# Patient Record
Sex: FEMALE | Race: WHITE | NOT HISPANIC OR LATINO
[De-identification: names, ages, dates, MRNs, and addresses within clinical notes are randomized per-mention and may not be internally consistent; named-entity substitution may affect disease eponyms.]

---

## 2017-01-10 ENCOUNTER — APPOINTMENT (OUTPATIENT)
Dept: SURGERY | Facility: CLINIC | Age: 62
End: 2017-01-10

## 2017-01-12 LAB
24R-OH-CALCIDIOL SERPL-MCNC: 69.3 PG/ML
CALCIUM SERPL-MCNC: 9.7 MG/DL
CALCIUM SERPL-MCNC: 9.7 MG/DL
PARATHYROID HORMONE INTACT: 48 PG/ML

## 2017-01-17 ENCOUNTER — OTHER (OUTPATIENT)
Age: 62
End: 2017-01-17

## 2017-07-13 ENCOUNTER — APPOINTMENT (OUTPATIENT)
Dept: SURGERY | Facility: CLINIC | Age: 62
End: 2017-07-13

## 2017-07-14 LAB
24R-OH-CALCIDIOL SERPL-MCNC: 61.8 PG/ML
CALCIUM SERPL-MCNC: 9.6 MG/DL
CALCIUM SERPL-MCNC: 9.6 MG/DL
PARATHYROID HORMONE INTACT: 32 PG/ML

## 2017-07-18 ENCOUNTER — MEDICATION RENEWAL (OUTPATIENT)
Age: 62
End: 2017-07-18

## 2017-07-20 ENCOUNTER — OTHER (OUTPATIENT)
Age: 62
End: 2017-07-20

## 2017-10-12 ENCOUNTER — NON-APPOINTMENT (OUTPATIENT)
Age: 62
End: 2017-10-12

## 2017-10-12 ENCOUNTER — APPOINTMENT (OUTPATIENT)
Dept: FAMILY MEDICINE | Facility: CLINIC | Age: 62
End: 2017-10-12
Payer: COMMERCIAL

## 2017-10-12 VITALS
HEIGHT: 64 IN | SYSTOLIC BLOOD PRESSURE: 128 MMHG | BODY MASS INDEX: 28.17 KG/M2 | HEART RATE: 72 BPM | TEMPERATURE: 98.3 F | DIASTOLIC BLOOD PRESSURE: 86 MMHG | OXYGEN SATURATION: 99 % | WEIGHT: 165 LBS

## 2017-10-12 PROCEDURE — 90472 IMMUNIZATION ADMIN EACH ADD: CPT

## 2017-10-12 PROCEDURE — 90715 TDAP VACCINE 7 YRS/> IM: CPT

## 2017-10-12 PROCEDURE — 93000 ELECTROCARDIOGRAM COMPLETE: CPT

## 2017-10-12 PROCEDURE — 99396 PREV VISIT EST AGE 40-64: CPT | Mod: 25

## 2017-10-12 PROCEDURE — G0008: CPT

## 2017-10-12 PROCEDURE — 36415 COLL VENOUS BLD VENIPUNCTURE: CPT

## 2017-10-12 PROCEDURE — 90688 IIV4 VACCINE SPLT 0.5 ML IM: CPT

## 2017-10-13 ENCOUNTER — RX RENEWAL (OUTPATIENT)
Age: 62
End: 2017-10-13

## 2017-12-01 ENCOUNTER — NON-APPOINTMENT (OUTPATIENT)
Age: 62
End: 2017-12-01

## 2017-12-01 ENCOUNTER — APPOINTMENT (OUTPATIENT)
Dept: CARDIOLOGY | Facility: CLINIC | Age: 62
End: 2017-12-01
Payer: COMMERCIAL

## 2017-12-01 VITALS
WEIGHT: 162 LBS | HEART RATE: 81 BPM | SYSTOLIC BLOOD PRESSURE: 127 MMHG | HEIGHT: 64 IN | DIASTOLIC BLOOD PRESSURE: 87 MMHG | BODY MASS INDEX: 27.66 KG/M2 | OXYGEN SATURATION: 97 %

## 2017-12-01 PROCEDURE — 93000 ELECTROCARDIOGRAM COMPLETE: CPT

## 2017-12-01 PROCEDURE — 99243 OFF/OP CNSLTJ NEW/EST LOW 30: CPT | Mod: 25

## 2017-12-01 RX ORDER — SCOPOLAMINE 1.5 MG/1
1 PATCH, EXTENDED RELEASE TRANSDERMAL
Qty: 4 | Refills: 1 | Status: DISCONTINUED | COMMUNITY
Start: 2017-10-16 | End: 2017-12-01

## 2017-12-07 ENCOUNTER — APPOINTMENT (OUTPATIENT)
Dept: CARDIOLOGY | Facility: CLINIC | Age: 62
End: 2017-12-07
Payer: COMMERCIAL

## 2017-12-07 PROCEDURE — 93306 TTE W/DOPPLER COMPLETE: CPT

## 2017-12-28 ENCOUNTER — APPOINTMENT (OUTPATIENT)
Dept: CARDIOLOGY | Facility: CLINIC | Age: 62
End: 2017-12-28
Payer: COMMERCIAL

## 2017-12-28 ENCOUNTER — NON-APPOINTMENT (OUTPATIENT)
Age: 62
End: 2017-12-28

## 2017-12-28 VITALS
DIASTOLIC BLOOD PRESSURE: 82 MMHG | BODY MASS INDEX: 27.66 KG/M2 | HEIGHT: 64 IN | HEART RATE: 83 BPM | OXYGEN SATURATION: 98 % | SYSTOLIC BLOOD PRESSURE: 124 MMHG | WEIGHT: 162 LBS

## 2017-12-28 DIAGNOSIS — Z82.49 FAMILY HISTORY OF ISCHEMIC HEART DISEASE AND OTHER DISEASES OF THE CIRCULATORY SYSTEM: ICD-10-CM

## 2017-12-28 PROCEDURE — 99214 OFFICE O/P EST MOD 30 MIN: CPT | Mod: 25

## 2017-12-28 PROCEDURE — 93000 ELECTROCARDIOGRAM COMPLETE: CPT

## 2018-01-10 ENCOUNTER — APPOINTMENT (OUTPATIENT)
Dept: CARDIOLOGY | Facility: CLINIC | Age: 63
End: 2018-01-10
Payer: COMMERCIAL

## 2018-01-10 PROCEDURE — 78452 HT MUSCLE IMAGE SPECT MULT: CPT

## 2018-01-10 PROCEDURE — 93017 CV STRESS TEST TRACING ONLY: CPT

## 2018-01-10 PROCEDURE — A9500: CPT

## 2018-01-10 PROCEDURE — 93018 CV STRESS TEST I&R ONLY: CPT

## 2018-01-11 ENCOUNTER — RX RENEWAL (OUTPATIENT)
Age: 63
End: 2018-01-11

## 2018-02-05 ENCOUNTER — TRANSCRIPTION ENCOUNTER (OUTPATIENT)
Age: 63
End: 2018-02-05

## 2018-02-06 ENCOUNTER — NON-APPOINTMENT (OUTPATIENT)
Age: 63
End: 2018-02-06

## 2018-02-06 ENCOUNTER — APPOINTMENT (OUTPATIENT)
Dept: CARDIOLOGY | Facility: CLINIC | Age: 63
End: 2018-02-06
Payer: COMMERCIAL

## 2018-02-06 VITALS
SYSTOLIC BLOOD PRESSURE: 141 MMHG | WEIGHT: 162 LBS | OXYGEN SATURATION: 100 % | BODY MASS INDEX: 27.66 KG/M2 | DIASTOLIC BLOOD PRESSURE: 86 MMHG | HEIGHT: 64 IN | HEART RATE: 63 BPM

## 2018-02-06 PROCEDURE — 93000 ELECTROCARDIOGRAM COMPLETE: CPT

## 2018-02-06 PROCEDURE — 99213 OFFICE O/P EST LOW 20 MIN: CPT | Mod: 25

## 2018-04-12 ENCOUNTER — TRANSCRIPTION ENCOUNTER (OUTPATIENT)
Age: 63
End: 2018-04-12

## 2018-04-15 ENCOUNTER — RX RENEWAL (OUTPATIENT)
Age: 63
End: 2018-04-15

## 2018-04-16 ENCOUNTER — TRANSCRIPTION ENCOUNTER (OUTPATIENT)
Age: 63
End: 2018-04-16

## 2018-06-12 ENCOUNTER — TRANSCRIPTION ENCOUNTER (OUTPATIENT)
Age: 63
End: 2018-06-12

## 2018-06-12 ENCOUNTER — APPOINTMENT (OUTPATIENT)
Dept: CARDIOLOGY | Facility: CLINIC | Age: 63
End: 2018-06-12
Payer: COMMERCIAL

## 2018-06-12 VITALS
BODY MASS INDEX: 29.37 KG/M2 | SYSTOLIC BLOOD PRESSURE: 129 MMHG | HEIGHT: 64 IN | OXYGEN SATURATION: 97 % | HEART RATE: 73 BPM | DIASTOLIC BLOOD PRESSURE: 82 MMHG | WEIGHT: 172 LBS

## 2018-06-12 PROCEDURE — 93000 ELECTROCARDIOGRAM COMPLETE: CPT

## 2018-06-12 PROCEDURE — 99213 OFFICE O/P EST LOW 20 MIN: CPT | Mod: 25

## 2018-06-12 RX ORDER — METOPROLOL SUCCINATE 25 MG/1
25 TABLET, EXTENDED RELEASE ORAL DAILY
Qty: 30 | Refills: 3 | Status: DISCONTINUED | COMMUNITY
Start: 2018-02-06 | End: 2018-06-12

## 2018-07-16 ENCOUNTER — APPOINTMENT (OUTPATIENT)
Dept: CARDIOLOGY | Facility: CLINIC | Age: 63
End: 2018-07-16
Payer: COMMERCIAL

## 2018-07-16 ENCOUNTER — NON-APPOINTMENT (OUTPATIENT)
Age: 63
End: 2018-07-16

## 2018-07-16 VITALS
WEIGHT: 170 LBS | HEIGHT: 64 IN | HEART RATE: 80 BPM | OXYGEN SATURATION: 97 % | BODY MASS INDEX: 29.02 KG/M2 | DIASTOLIC BLOOD PRESSURE: 84 MMHG | SYSTOLIC BLOOD PRESSURE: 138 MMHG

## 2018-07-16 DIAGNOSIS — R07.89 OTHER CHEST PAIN: ICD-10-CM

## 2018-07-16 PROCEDURE — 99214 OFFICE O/P EST MOD 30 MIN: CPT | Mod: 25

## 2018-07-16 PROCEDURE — 93000 ELECTROCARDIOGRAM COMPLETE: CPT

## 2018-10-07 ENCOUNTER — TRANSCRIPTION ENCOUNTER (OUTPATIENT)
Age: 63
End: 2018-10-07

## 2018-10-11 ENCOUNTER — TRANSCRIPTION ENCOUNTER (OUTPATIENT)
Age: 63
End: 2018-10-11

## 2018-11-08 ENCOUNTER — RECORD ABSTRACTING (OUTPATIENT)
Age: 63
End: 2018-11-08

## 2018-11-09 ENCOUNTER — APPOINTMENT (OUTPATIENT)
Dept: ENDOCRINOLOGY | Facility: CLINIC | Age: 63
End: 2018-11-09
Payer: COMMERCIAL

## 2018-11-09 VITALS
SYSTOLIC BLOOD PRESSURE: 130 MMHG | BODY MASS INDEX: 30.22 KG/M2 | WEIGHT: 177 LBS | DIASTOLIC BLOOD PRESSURE: 80 MMHG | HEART RATE: 87 BPM | HEIGHT: 64 IN

## 2018-11-09 DIAGNOSIS — Z86.39 PERSONAL HISTORY OF OTHER ENDOCRINE, NUTRITIONAL AND METABOLIC DISEASE: ICD-10-CM

## 2018-11-09 PROCEDURE — 99214 OFFICE O/P EST MOD 30 MIN: CPT

## 2018-11-20 ENCOUNTER — TRANSCRIPTION ENCOUNTER (OUTPATIENT)
Age: 63
End: 2018-11-20

## 2018-12-13 ENCOUNTER — FORM ENCOUNTER (OUTPATIENT)
Age: 63
End: 2018-12-13

## 2018-12-14 ENCOUNTER — APPOINTMENT (OUTPATIENT)
Dept: ULTRASOUND IMAGING | Facility: CLINIC | Age: 63
End: 2018-12-14
Payer: COMMERCIAL

## 2018-12-14 ENCOUNTER — OUTPATIENT (OUTPATIENT)
Dept: OUTPATIENT SERVICES | Facility: HOSPITAL | Age: 63
LOS: 1 days | End: 2018-12-14
Payer: COMMERCIAL

## 2018-12-14 DIAGNOSIS — Z00.8 ENCOUNTER FOR OTHER GENERAL EXAMINATION: ICD-10-CM

## 2018-12-14 DIAGNOSIS — Z90.49 ACQUIRED ABSENCE OF OTHER SPECIFIED PARTS OF DIGESTIVE TRACT: Chronic | ICD-10-CM

## 2018-12-14 DIAGNOSIS — E04.1 NONTOXIC SINGLE THYROID NODULE: ICD-10-CM

## 2018-12-14 PROCEDURE — 76536 US EXAM OF HEAD AND NECK: CPT

## 2018-12-14 PROCEDURE — 76536 US EXAM OF HEAD AND NECK: CPT | Mod: 26

## 2019-01-18 ENCOUNTER — APPOINTMENT (OUTPATIENT)
Dept: CARDIOLOGY | Facility: CLINIC | Age: 64
End: 2019-01-18
Payer: COMMERCIAL

## 2019-01-18 ENCOUNTER — NON-APPOINTMENT (OUTPATIENT)
Age: 64
End: 2019-01-18

## 2019-01-18 VITALS
OXYGEN SATURATION: 100 % | DIASTOLIC BLOOD PRESSURE: 75 MMHG | HEART RATE: 77 BPM | WEIGHT: 175 LBS | SYSTOLIC BLOOD PRESSURE: 114 MMHG | HEIGHT: 64 IN | BODY MASS INDEX: 29.88 KG/M2

## 2019-01-18 PROCEDURE — 93000 ELECTROCARDIOGRAM COMPLETE: CPT

## 2019-01-18 PROCEDURE — 99213 OFFICE O/P EST LOW 20 MIN: CPT | Mod: 25

## 2019-01-28 ENCOUNTER — RX RENEWAL (OUTPATIENT)
Age: 64
End: 2019-01-28

## 2019-02-12 ENCOUNTER — NON-APPOINTMENT (OUTPATIENT)
Age: 64
End: 2019-02-12

## 2019-02-12 NOTE — HISTORY OF PRESENT ILLNESS
[FreeTextEntry1] : Pt presents today for f/u.  I have previously seen her for atypical CP.  She states that she has been feeling well and has no cardiac complaints.  She denies CP, SOB, diaphoresis, palpitations, dizziness, syncope, LE edema, PND. \par She has PMH hypothyroidism\par \par \par

## 2019-02-12 NOTE — PHYSICAL EXAM
[General Appearance - Well Developed] : well developed [Normal Appearance] : normal appearance [Well Groomed] : well groomed [General Appearance - Well Nourished] : well nourished [No Deformities] : no deformities [General Appearance - In No Acute Distress] : no acute distress [Normal Conjunctiva] : the conjunctiva exhibited no abnormalities [Eyelids - No Xanthelasma] : the eyelids demonstrated no xanthelasmas [Normal Oral Mucosa] : normal oral mucosa [No Oral Pallor] : no oral pallor [No Oral Cyanosis] : no oral cyanosis [Respiration, Rhythm And Depth] : normal respiratory rhythm and effort [Exaggerated Use Of Accessory Muscles For Inspiration] : no accessory muscle use [Auscultation Breath Sounds / Voice Sounds] : lungs were clear to auscultation bilaterally [Heart Rate And Rhythm] : heart rate and rhythm were normal [Heart Sounds] : normal S1 and S2 [Murmurs] : no murmurs present [Arterial Pulses Normal] : the arterial pulses were normal [Edema] : no peripheral edema present [Abnormal Walk] : normal gait [Gait - Sufficient For Exercise Testing] : the gait was sufficient for exercise testing [Nail Clubbing] : no clubbing of the fingernails [Cyanosis, Localized] : no localized cyanosis [Petechial Hemorrhages (___cm)] : no petechial hemorrhages [] : no ischemic changes [Oriented To Time, Place, And Person] : oriented to person, place, and time [Impaired Insight] : insight and judgment were intact [Affect] : the affect was normal [Mood] : the mood was normal [No Anxiety] : not feeling anxious [FreeTextEntry1] : no JVD or bruits

## 2019-02-12 NOTE — DISCUSSION/SUMMARY
[FreeTextEntry1] : Pt is a 62 y/o F who presents today for f/u\par She has PMH HLD, RBBB\par No ECG changes\par  TTE done 12/2017 which showed normal LV function without significant valvular pathology\par Nuclear stress test 01/2018 was negative for ischemia\par HLD: she is due for PE with PCP and she will repeat labs at that time.  Advised lifestyle modifications \par She will f/u in 6-12 mnths or sooner PRN\par The described plan was discussed with the pt.  All questions and concerns were addressed to the best of my knowledge.\par

## 2019-05-10 ENCOUNTER — APPOINTMENT (OUTPATIENT)
Dept: ENDOCRINOLOGY | Facility: CLINIC | Age: 64
End: 2019-05-10
Payer: COMMERCIAL

## 2019-05-10 VITALS
BODY MASS INDEX: 29.37 KG/M2 | HEART RATE: 83 BPM | WEIGHT: 172 LBS | HEIGHT: 64 IN | SYSTOLIC BLOOD PRESSURE: 114 MMHG | DIASTOLIC BLOOD PRESSURE: 66 MMHG

## 2019-05-10 PROCEDURE — 99214 OFFICE O/P EST MOD 30 MIN: CPT

## 2019-06-07 ENCOUNTER — APPOINTMENT (OUTPATIENT)
Dept: FAMILY MEDICINE | Facility: CLINIC | Age: 64
End: 2019-06-07
Payer: COMMERCIAL

## 2019-06-07 VITALS
SYSTOLIC BLOOD PRESSURE: 132 MMHG | DIASTOLIC BLOOD PRESSURE: 80 MMHG | BODY MASS INDEX: 30.22 KG/M2 | WEIGHT: 177 LBS | OXYGEN SATURATION: 98 % | HEIGHT: 64 IN | HEART RATE: 86 BPM

## 2019-06-07 VITALS — SYSTOLIC BLOOD PRESSURE: 130 MMHG | DIASTOLIC BLOOD PRESSURE: 80 MMHG

## 2019-06-07 DIAGNOSIS — R07.89 OTHER CHEST PAIN: ICD-10-CM

## 2019-06-07 DIAGNOSIS — Z01.84 ENCOUNTER FOR ANTIBODY RESPONSE EXAMINATION: ICD-10-CM

## 2019-06-07 PROCEDURE — 99396 PREV VISIT EST AGE 40-64: CPT | Mod: 25

## 2019-06-07 PROCEDURE — 36415 COLL VENOUS BLD VENIPUNCTURE: CPT

## 2019-06-07 PROCEDURE — G0444 DEPRESSION SCREEN ANNUAL: CPT

## 2019-06-07 NOTE — END OF VISIT
[FreeTextEntry3] : Medical record entries made by the scribe today today, were at my direction and personally dictated to them by me, Dr. Nilam Huff on Jun 07, 2019. I have reviewed the chart and agree that the record accurately reflects my personal performance of the history, physical exam, assessment, and plan.\par \par

## 2019-06-07 NOTE — HEALTH RISK ASSESSMENT
[0] : 1) Little interest or pleasure doing things: Not at all (0) [Patient reported PAP Smear was normal] : Patient reported PAP Smear was normal [Patient reported mammogram was normal] : Patient reported mammogram was normal [Patient reported bone density results were normal] : Patient reported bone density results were normal [Patient reported colonoscopy was normal] : Patient reported colonoscopy was normal [Good] : ~his/her~  mood as  good [With Family] : lives with family [] :  [# Of Children ___] : has [unfilled] children [HIV test declined] : HIV test declined [] : No [de-identified] : well balanced high calium, dark leafy greens [de-identified] : walking [[id number]] : 0 [de-identified] :  (endo)  (cardio)  (surg) [MammogramDate] : 04/19 [PapSmearDate] : 02/19 [ColonoscopyDate] : 01/16 [PapSmearComments] : Dr. tello [BoneDensityDate] : 06/18 [ColonoscopyComments] : Dr. Messer [HIVDate] : 06/19

## 2019-06-07 NOTE — ADDENDUM
[FreeTextEntry1] : I, Cristina Felix acting as a scribe for Dr. Nilam Huff on Jun 07, 2019  at 2:02 PM\par

## 2019-06-07 NOTE — PLAN
[FreeTextEntry1] : - Blood work today\par - Thyroid sonogram ordered\par - Reviewed risk/benefits/alternatives of the shingrix vaccine. \par - Encourage diet and exercise\par - Follow up with \par - Follow up with Dr. Pepper

## 2019-06-07 NOTE — PHYSICAL EXAM
[Well Nourished] : well nourished [No Acute Distress] : no acute distress [Well Developed] : well developed [Well-Appearing] : well-appearing [Normal Sclera/Conjunctiva] : normal sclera/conjunctiva [PERRL] : pupils equal round and reactive to light [Normal Outer Ear/Nose] : the outer ears and nose were normal in appearance [EOMI] : extraocular movements intact [No JVD] : no jugular venous distention [Normal Oropharynx] : the oropharynx was normal [Supple] : supple [No Lymphadenopathy] : no lymphadenopathy [Thyroid Normal, No Nodules] : the thyroid was normal and there were no nodules present [No Respiratory Distress] : no respiratory distress  [Clear to Auscultation] : lungs were clear to auscultation bilaterally [No Accessory Muscle Use] : no accessory muscle use [Normal Rate] : normal rate  [Regular Rhythm] : with a regular rhythm [Normal S1, S2] : normal S1 and S2 [No Murmur] : no murmur heard [No Carotid Bruits] : no carotid bruits [No Abdominal Bruit] : a ~M bruit was not heard ~T in the abdomen [No Varicosities] : no varicosities [Pedal Pulses Present] : the pedal pulses are present [No Edema] : there was no peripheral edema [No Palpable Aorta] : no palpable aorta [No Extremity Clubbing/Cyanosis] : no extremity clubbing/cyanosis [Non Tender] : non-tender [Soft] : abdomen soft [Non-distended] : non-distended [No Masses] : no abdominal mass palpated [No HSM] : no HSM [Normal Bowel Sounds] : normal bowel sounds [Normal Posterior Cervical Nodes] : no posterior cervical lymphadenopathy [No CVA Tenderness] : no CVA  tenderness [Normal Anterior Cervical Nodes] : no anterior cervical lymphadenopathy [No Spinal Tenderness] : no spinal tenderness [No Joint Swelling] : no joint swelling [Grossly Normal Strength/Tone] : grossly normal strength/tone [Normal Gait] : normal gait [No Rash] : no rash [Coordination Grossly Intact] : coordination grossly intact [No Focal Deficits] : no focal deficits [Deep Tendon Reflexes (DTR)] : deep tendon reflexes were 2+ and symmetric [Normal Affect] : the affect was normal [Normal Insight/Judgement] : insight and judgment were intact

## 2019-06-07 NOTE — HISTORY OF PRESENT ILLNESS
[de-identified] : MARISSA is a 63 year female here for CPE. Patient feels well. Follows up with Dr. Antunez for hypothyroid and thyroid nodule. Currently taking synthroid 75 mcg. Last thyroid sono 12/2018.  Patient up to date with mammogram 4/2019, pap smear 2/2019, bone density 6/18 and colonoscopy 1/16.  Followed up with Evgeny in May. Has appointment in July. Thyroid levels previously elevated, rechecked today by [facility]. Next appointment 11/19. Follows up with [facility].  [FreeTextEntry1] : CPE, pt has no complaints

## 2019-06-13 ENCOUNTER — OTHER (OUTPATIENT)
Age: 64
End: 2019-06-13

## 2019-06-13 DIAGNOSIS — T75.3XXA MOTION SICKNESS, INITIAL ENCOUNTER: ICD-10-CM

## 2019-06-25 ENCOUNTER — MEDICATION RENEWAL (OUTPATIENT)
Age: 64
End: 2019-06-25

## 2019-06-25 LAB
25(OH)D3 SERPL-MCNC: 39.5 NG/ML
ALBUMIN SERPL ELPH-MCNC: 4.5 G/DL
ALP BLD-CCNC: 87 U/L
ALT SERPL-CCNC: 17 U/L
ANION GAP SERPL CALC-SCNC: 16 MMOL/L
APPEARANCE: CLEAR
AST SERPL-CCNC: 20 U/L
BACTERIA: NEGATIVE
BASOPHILS # BLD AUTO: 0.05 K/UL
BASOPHILS NFR BLD AUTO: 0.8 %
BILIRUB SERPL-MCNC: 0.2 MG/DL
BILIRUBIN URINE: NEGATIVE
BLOOD URINE: NEGATIVE
BUN SERPL-MCNC: 32 MG/DL
CALCIUM SERPL-MCNC: 9.7 MG/DL
CHLORIDE SERPL-SCNC: 103 MMOL/L
CHOLEST SERPL-MCNC: 235 MG/DL
CHOLEST/HDLC SERPL: 5 RATIO
CO2 SERPL-SCNC: 25 MMOL/L
COLOR: YELLOW
CREAT SERPL-MCNC: 0.95 MG/DL
EOSINOPHIL # BLD AUTO: 0.05 K/UL
EOSINOPHIL NFR BLD AUTO: 0.8 %
ESTIMATED AVERAGE GLUCOSE: 114 MG/DL
GLUCOSE QUALITATIVE U: NEGATIVE
GLUCOSE SERPL-MCNC: 116 MG/DL
HBA1C MFR BLD HPLC: 5.6 %
HCT VFR BLD CALC: 40.6 %
HCV AB SER QL: NONREACTIVE
HCV S/CO RATIO: 0.26 S/CO
HDLC SERPL-MCNC: 47 MG/DL
HGB BLD-MCNC: 13.1 G/DL
HYALINE CASTS: 0 /LPF
IMM GRANULOCYTES NFR BLD AUTO: 0.2 %
KETONES URINE: NEGATIVE
LDLC SERPL CALC-MCNC: 125 MG/DL
LEUKOCYTE ESTERASE URINE: NEGATIVE
LYMPHOCYTES # BLD AUTO: 1.74 K/UL
LYMPHOCYTES NFR BLD AUTO: 29.1 %
MAN DIFF?: NORMAL
MCHC RBC-ENTMCNC: 30.7 PG
MCHC RBC-ENTMCNC: 32.3 GM/DL
MCV RBC AUTO: 95.1 FL
MEV IGG FLD QL IA: >300 AU/ML
MEV IGG+IGM SER-IMP: POSITIVE
MICROSCOPIC-UA: NORMAL
MONOCYTES # BLD AUTO: 0.41 K/UL
MONOCYTES NFR BLD AUTO: 6.9 %
NEUTROPHILS # BLD AUTO: 3.71 K/UL
NEUTROPHILS NFR BLD AUTO: 62.2 %
NITRITE URINE: NEGATIVE
PH URINE: 7.5
PLATELET # BLD AUTO: 274 K/UL
POTASSIUM SERPL-SCNC: 4.8 MMOL/L
PROT SERPL-MCNC: 7.3 G/DL
PROTEIN URINE: NORMAL
RBC # BLD: 4.27 M/UL
RBC # FLD: 12.4 %
RED BLOOD CELLS URINE: 6 /HPF
SODIUM SERPL-SCNC: 144 MMOL/L
SPECIFIC GRAVITY URINE: 1.03
SQUAMOUS EPITHELIAL CELLS: 0 /HPF
TRIGL SERPL-MCNC: 313 MG/DL
URATE SERPL-MCNC: 5.8 MG/DL
UROBILINOGEN URINE: NORMAL
WBC # FLD AUTO: 5.97 K/UL
WHITE BLOOD CELLS URINE: 0 /HPF

## 2019-08-23 ENCOUNTER — APPOINTMENT (OUTPATIENT)
Dept: ENDOCRINOLOGY | Facility: CLINIC | Age: 64
End: 2019-08-23
Payer: COMMERCIAL

## 2019-08-23 VITALS
HEART RATE: 79 BPM | SYSTOLIC BLOOD PRESSURE: 144 MMHG | HEIGHT: 64 IN | DIASTOLIC BLOOD PRESSURE: 90 MMHG | BODY MASS INDEX: 30.9 KG/M2 | WEIGHT: 181 LBS

## 2019-08-23 PROCEDURE — 99214 OFFICE O/P EST MOD 30 MIN: CPT

## 2019-09-25 ENCOUNTER — APPOINTMENT (OUTPATIENT)
Dept: FAMILY MEDICINE | Facility: CLINIC | Age: 64
End: 2019-09-25
Payer: COMMERCIAL

## 2019-09-25 VITALS
OXYGEN SATURATION: 98 % | DIASTOLIC BLOOD PRESSURE: 90 MMHG | HEIGHT: 64 IN | SYSTOLIC BLOOD PRESSURE: 132 MMHG | TEMPERATURE: 97.7 F | HEART RATE: 81 BPM | BODY MASS INDEX: 30.9 KG/M2 | WEIGHT: 181 LBS

## 2019-09-25 DIAGNOSIS — M54.9 DORSALGIA, UNSPECIFIED: ICD-10-CM

## 2019-09-25 DIAGNOSIS — N28.9 DISORDER OF KIDNEY AND URETER, UNSPECIFIED: ICD-10-CM

## 2019-09-25 PROCEDURE — 99214 OFFICE O/P EST MOD 30 MIN: CPT | Mod: 25

## 2019-09-25 PROCEDURE — 36415 COLL VENOUS BLD VENIPUNCTURE: CPT

## 2019-09-25 RX ORDER — LEVOTHYROXINE SODIUM 75 UG/1
75 TABLET ORAL
Qty: 90 | Refills: 0 | Status: COMPLETED | COMMUNITY
Start: 2019-01-28

## 2019-09-25 NOTE — ASSESSMENT
[FreeTextEntry1] : see lab orders\par \par see radiology orders \par \par Tx pending imaging results

## 2019-09-25 NOTE — PHYSICAL EXAM
[No Acute Distress] : no acute distress [Well Nourished] : well nourished [Well Developed] : well developed [Well-Appearing] : well-appearing [EOMI] : extraocular movements intact [Normal Outer Ear/Nose] : the outer ears and nose were normal in appearance [No Respiratory Distress] : no respiratory distress  [No JVD] : no jugular venous distention [No Accessory Muscle Use] : no accessory muscle use [Clear to Auscultation] : lungs were clear to auscultation bilaterally [Regular Rhythm] : with a regular rhythm [Normal Rate] : normal rate  [Normal S1, S2] : normal S1 and S2 [No Carotid Bruits] : no carotid bruits [No Edema] : there was no peripheral edema [Soft] : abdomen soft [Non Tender] : non-tender [Non-distended] : non-distended [No Masses] : no abdominal mass palpated [No HSM] : no HSM [Normal Bowel Sounds] : normal bowel sounds [No CVA Tenderness] : no CVA  tenderness [Grossly Normal Strength/Tone] : grossly normal strength/tone [Coordination Grossly Intact] : coordination grossly intact [No Rash] : no rash [No Focal Deficits] : no focal deficits [Normal Gait] : normal gait [Normal Affect] : the affect was normal [Normal Mood] : the mood was normal [Normal Insight/Judgement] : insight and judgment were intact

## 2019-09-26 LAB
ALBUMIN SERPL ELPH-MCNC: 4.6 G/DL
ALP BLD-CCNC: 100 U/L
ALT SERPL-CCNC: 18 U/L
ANION GAP SERPL CALC-SCNC: 14 MMOL/L
APPEARANCE: CLEAR
AST SERPL-CCNC: 19 U/L
BACTERIA: NEGATIVE
BASOPHILS # BLD AUTO: 0.03 K/UL
BASOPHILS NFR BLD AUTO: 0.6 %
BILIRUB SERPL-MCNC: 0.4 MG/DL
BILIRUBIN URINE: NEGATIVE
BLOOD URINE: NEGATIVE
BUN SERPL-MCNC: 13 MG/DL
CALCIUM SERPL-MCNC: 9.7 MG/DL
CHLORIDE SERPL-SCNC: 99 MMOL/L
CO2 SERPL-SCNC: 27 MMOL/L
COLOR: YELLOW
CREAT SERPL-MCNC: 0.88 MG/DL
EOSINOPHIL # BLD AUTO: 0.04 K/UL
EOSINOPHIL NFR BLD AUTO: 0.8 %
GLUCOSE QUALITATIVE U: NEGATIVE
GLUCOSE SERPL-MCNC: 94 MG/DL
HCT VFR BLD CALC: 43.3 %
HGB BLD-MCNC: 13.8 G/DL
HYALINE CASTS: 0 /LPF
IMM GRANULOCYTES NFR BLD AUTO: 0 %
KETONES URINE: NEGATIVE
LEUKOCYTE ESTERASE URINE: NEGATIVE
LYMPHOCYTES # BLD AUTO: 1.37 K/UL
LYMPHOCYTES NFR BLD AUTO: 26.8 %
MAN DIFF?: NORMAL
MCHC RBC-ENTMCNC: 30.5 PG
MCHC RBC-ENTMCNC: 31.9 GM/DL
MCV RBC AUTO: 95.6 FL
MICROSCOPIC-UA: NORMAL
MONOCYTES # BLD AUTO: 0.42 K/UL
MONOCYTES NFR BLD AUTO: 8.2 %
NEUTROPHILS # BLD AUTO: 3.26 K/UL
NEUTROPHILS NFR BLD AUTO: 63.6 %
NITRITE URINE: NEGATIVE
PH URINE: 6.5
PLATELET # BLD AUTO: 279 K/UL
POTASSIUM SERPL-SCNC: 4 MMOL/L
PROT SERPL-MCNC: 7.3 G/DL
PROTEIN URINE: NEGATIVE
RBC # BLD: 4.53 M/UL
RBC # FLD: 12.6 %
RED BLOOD CELLS URINE: 1 /HPF
SODIUM SERPL-SCNC: 140 MMOL/L
SPECIFIC GRAVITY URINE: 1
SQUAMOUS EPITHELIAL CELLS: 0 /HPF
UROBILINOGEN URINE: NORMAL
WBC # FLD AUTO: 5.12 K/UL
WHITE BLOOD CELLS URINE: 0 /HPF

## 2019-10-01 LAB — BACTERIA UR CULT: NORMAL

## 2019-10-11 ENCOUNTER — APPOINTMENT (OUTPATIENT)
Dept: UROLOGY | Facility: CLINIC | Age: 64
End: 2019-10-11
Payer: COMMERCIAL

## 2019-10-11 VITALS
DIASTOLIC BLOOD PRESSURE: 85 MMHG | SYSTOLIC BLOOD PRESSURE: 137 MMHG | RESPIRATION RATE: 16 BRPM | WEIGHT: 175 LBS | HEIGHT: 64 IN | OXYGEN SATURATION: 99 % | TEMPERATURE: 98.1 F | BODY MASS INDEX: 29.88 KG/M2 | HEART RATE: 76 BPM

## 2019-10-11 DIAGNOSIS — R31.29 OTHER MICROSCOPIC HEMATURIA: ICD-10-CM

## 2019-10-11 PROCEDURE — 99204 OFFICE O/P NEW MOD 45 MIN: CPT

## 2019-10-11 NOTE — ASSESSMENT
[FreeTextEntry1] : 63 yo F with LEFT hydronephrosis with duplicated system and history of ureteral obstruction. Recommended CTU, sami given AMH from 6/2019. Pt agrees and understands.

## 2019-10-11 NOTE — HISTORY OF PRESENT ILLNESS
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 This began weeks ago, but has since resolved. \par It was moderate in severity. Nothing makes the symptoms better, nothing makes sx worse though patient thinks it may have been associated with a Protein Shake diet she was doing at the time. It is associated with nothing. Recent renal US showed increased LEFT hydronephrosis. UA (6/2019) showed 6 RBCs/hpf. She had a ureteroscopy several years ago for obstruction of her duplicate ureter.  \par No hematuria, no dysuria, no frequency, no urgency, no hesitancy, no straining. No incontinence. \par No fevers, no chills, no nausea, no vomiting. Cr 0.88 (9/2019).\par \par No family history contributory to LEFT hydronephrosis.

## 2019-10-11 NOTE — PHYSICAL EXAM
[General Appearance - Well Developed] : well developed [General Appearance - Well Nourished] : well nourished [Well Groomed] : well groomed [Normal Appearance] : normal appearance [General Appearance - In No Acute Distress] : no acute distress [Edema] : no peripheral edema [Abdomen Soft] : soft [Respiration, Rhythm And Depth] : normal respiratory rhythm and effort [Exaggerated Use Of Accessory Muscles For Inspiration] : no accessory muscle use [Abdomen Tenderness] : non-tender [Costovertebral Angle Tenderness] : no ~M costovertebral angle tenderness [Urinary Bladder Findings] : the bladder was normal on palpation [Normal Station and Gait] : the gait and station were normal for the patient's age [] : no rash [Oriented To Time, Place, And Person] : oriented to person, place, and time [No Focal Deficits] : no focal deficits [Affect] : the affect was normal [Mood] : the mood was normal [Not Anxious] : not anxious [No Palpable Adenopathy] : no palpable adenopathy

## 2019-10-14 LAB
APPEARANCE: CLEAR
BACTERIA UR CULT: NORMAL
BACTERIA: NEGATIVE
BILIRUBIN URINE: NEGATIVE
BLOOD URINE: NEGATIVE
COLOR: YELLOW
GLUCOSE QUALITATIVE U: NEGATIVE
HYALINE CASTS: 0 /LPF
KETONES URINE: NEGATIVE
LEUKOCYTE ESTERASE URINE: NEGATIVE
MICROSCOPIC-UA: NORMAL
NITRITE URINE: NEGATIVE
PH URINE: 7.5
PROTEIN URINE: NEGATIVE
RED BLOOD CELLS URINE: 1 /HPF
SPECIFIC GRAVITY URINE: 1.01
SQUAMOUS EPITHELIAL CELLS: 0 /HPF
UROBILINOGEN URINE: NORMAL
WHITE BLOOD CELLS URINE: 0 /HPF

## 2019-10-31 ENCOUNTER — FORM ENCOUNTER (OUTPATIENT)
Age: 64
End: 2019-10-31

## 2019-11-01 ENCOUNTER — APPOINTMENT (OUTPATIENT)
Dept: CT IMAGING | Facility: CLINIC | Age: 64
End: 2019-11-01
Payer: COMMERCIAL

## 2019-11-01 ENCOUNTER — OUTPATIENT (OUTPATIENT)
Dept: OUTPATIENT SERVICES | Facility: HOSPITAL | Age: 64
LOS: 1 days | End: 2019-11-01
Payer: COMMERCIAL

## 2019-11-01 DIAGNOSIS — Z90.49 ACQUIRED ABSENCE OF OTHER SPECIFIED PARTS OF DIGESTIVE TRACT: Chronic | ICD-10-CM

## 2019-11-01 DIAGNOSIS — N13.30 UNSPECIFIED HYDRONEPHROSIS: ICD-10-CM

## 2019-11-01 PROCEDURE — 74178 CT ABD&PLV WO CNTR FLWD CNTR: CPT

## 2019-11-01 PROCEDURE — 82565 ASSAY OF CREATININE: CPT

## 2019-11-01 PROCEDURE — 74178 CT ABD&PLV WO CNTR FLWD CNTR: CPT | Mod: 26

## 2019-11-08 ENCOUNTER — APPOINTMENT (OUTPATIENT)
Dept: UROLOGY | Facility: CLINIC | Age: 64
End: 2019-11-08
Payer: COMMERCIAL

## 2019-11-08 PROCEDURE — 99213 OFFICE O/P EST LOW 20 MIN: CPT

## 2019-11-14 ENCOUNTER — APPOINTMENT (OUTPATIENT)
Dept: ENDOCRINOLOGY | Facility: CLINIC | Age: 64
End: 2019-11-14

## 2019-11-15 ENCOUNTER — APPOINTMENT (OUTPATIENT)
Dept: ENDOCRINOLOGY | Facility: CLINIC | Age: 64
End: 2019-11-15
Payer: COMMERCIAL

## 2019-11-15 VITALS — WEIGHT: 184 LBS | BODY MASS INDEX: 31.58 KG/M2

## 2019-11-15 VITALS — DIASTOLIC BLOOD PRESSURE: 84 MMHG | HEART RATE: 82 BPM | SYSTOLIC BLOOD PRESSURE: 126 MMHG | HEIGHT: 64 IN

## 2019-11-15 PROCEDURE — 99214 OFFICE O/P EST MOD 30 MIN: CPT

## 2019-11-15 RX ORDER — MULTIVITAMIN
CAPSULE ORAL TWICE DAILY
Refills: 0 | Status: ACTIVE | COMMUNITY

## 2019-11-15 RX ORDER — SCOPOLAMINE 1 MG/3D
1 PATCH, EXTENDED RELEASE TRANSDERMAL
Qty: 1 | Refills: 0 | Status: DISCONTINUED | COMMUNITY
Start: 2019-06-13 | End: 2019-11-15

## 2019-11-15 NOTE — REVIEW OF SYSTEMS
[Recent Weight Gain (___ Lbs)] : recent [unfilled] ~Ulb weight gain [As Noted in HPI] : as noted in HPI [Fatigue] : no fatigue [Blurry Vision] : no blurred vision [Chest Pain] : no chest pain [Palpitations] : no palpitations [Shortness Of Breath] : no shortness of breath [SOB on Exertion] : no shortness of breath during exertion [Constipation] : no constipation [Diarrhea] : no diarrhea [Polyuria] : no polyuria [Nocturia] : no nocturia [Polydipsia] : no polydipsia [Cold Intolerance] : cold tolerant [Heat Intolerance] : heat tolerant

## 2019-11-15 NOTE — PHYSICAL EXAM
[Alert] : alert [No Acute Distress] : no acute distress [Well Nourished] : well nourished [Well Developed] : well developed [EOMI] : extra ocular movement intact [Normal Sclera/Conjunctiva] : normal sclera/conjunctiva [No Neck Mass] : no neck mass was observed [Thyroid Not Enlarged] : the thyroid was not enlarged [Well Healed Scar] : well healed scar [No Thyroid Nodules] : there were no palpable thyroid nodules [Normal Rate and Effort] : normal respiratory rhythm and effort [Clear to Auscultation] : lungs were clear to auscultation bilaterally [Normal S1, S2] : normal S1 and S2 [Normal Rate] : heart rate was normal  [Normal Gait] : normal gait [No Rash] : no rash [Cranial Nerves Intact] : cranial nerves 2-12 were intact [Normal Reflexes] : deep tendon reflexes were 2+ and symmetric [No Tremors] : no tremors [Oriented x3] : oriented to person, place, and time [Normal Insight/Judgement] : insight and judgment were intact [Normal Affect] : the affect was normal [Normal Mood] : the mood was normal

## 2019-11-15 NOTE — HISTORY OF PRESENT ILLNESS
[FreeTextEntry1] : no issues today\par \par Hypothyroid Dx 2010 on routine blood test, on Synthroid (AKBAR) 88 mcg - adherent with dosing and administration, dose increased June 2019\par Left thyroid nodule DX 2016 on sonogram\par denies anterior neck pain, dysphagia or voice changes.\par \par Osteopenia on DXA 2016 and 2018 - h/o primary hyperparathyroidism s/p left inferior parathyroid adenoma removal in 2016 \par on Vit D 1000 units daily. denies back/hip pains. denies falls/fracture\par Dairy intake - no calcium supplements, yogurt daily\par Exercise - walks daily\par

## 2019-11-15 NOTE — REASON FOR VISIT
[Follow-Up: _____] : a [unfilled] follow-up visit [FreeTextEntry1] :  hypothyroid, Left thyroid nodule, PreDM, osteopenia

## 2019-11-15 NOTE — ASSESSMENT
[FreeTextEntry1] : 1. Hypothyroid - clinically and biochemically euthyroid\par - cont Synthroid\par - repeat TFTs 1 week before next visit\par \par 2. small Left thyroid nodule - no seen on sono 12/2018\par -  repeat sono 12/2019\par \par 3. PreDM - worsening A1c\par - cont to montior\par - counseled pt on low carb diet\par - increase CV exercise\par \par 4. Osteopenia - vit D and calcium okay, asymptomatic\par - DXA 6/2010\par - encouraged weight bearing exercises\par - cont Vit D, increase calcium containing foods (not ice cream due to fasting hyperglycemia)

## 2019-11-15 NOTE — DATA REVIEWED
[FreeTextEntry1] : 6/24/16 parathyroid imaging - Left lower pole parathyroid adenoma\par \par DXA 6/3/16 tscores\par L1-L4 -0.4\par LFN -1.4\par RFN -1.5\par L forearm -1.2\par \par DXA 6/2018 Tscores\par L1-L4 -0.3\par Left FN -1.6\par Right FN -1.4\par ------------------------------------------------------------------------------------\par \par thyroid sonogram 6/3/16\par diffusely heterogeneous thyroid\par Left M-L pole hyperechoic nodule 6x5x5 mm\par Inferior to Left thyroid 1.6x.8x.8 cm - lymph node vs parathyroid nodule\par \par 9/21/16 Left parathyroid - inferior - hypercellular\par \par Thyroid songram 11/14/16\par Left MP nodule 7x5x6 mm -stable\par \par Thyroid sonogram 12/2017 - Left thyroid nodule 6x6x7 mm\par \par Thyroid sono 12/2018 diffusely heterogenous gland, 5 mm area not nodule but heterogeneou area\par ----------------------------------------------------------------------------------------\par Labs 11/8/19\par Gluc 98\par Cr 0.97, GFR 63\par A1c 5.8%\par FT4 1.53, TSH 0.726\par Ca 9.4, Vit D 39.4\par

## 2019-11-18 NOTE — PHYSICAL EXAM
[General Appearance - Well Developed] : well developed [General Appearance - Well Nourished] : well nourished [Normal Appearance] : normal appearance [Well Groomed] : well groomed [General Appearance - In No Acute Distress] : no acute distress [Abdomen Soft] : soft [Abdomen Tenderness] : non-tender [Costovertebral Angle Tenderness] : no ~M costovertebral angle tenderness [Edema] : no peripheral edema [Urinary Bladder Findings] : the bladder was normal on palpation [] : no respiratory distress [Respiration, Rhythm And Depth] : normal respiratory rhythm and effort [Exaggerated Use Of Accessory Muscles For Inspiration] : no accessory muscle use [Oriented To Time, Place, And Person] : oriented to person, place, and time [Affect] : the affect was normal [Mood] : the mood was normal [Not Anxious] : not anxious [Normal Station and Gait] : the gait and station were normal for the patient's age [No Focal Deficits] : no focal deficits [No Palpable Adenopathy] : no palpable adenopathy

## 2019-11-18 NOTE — HISTORY OF PRESENT ILLNESS
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 This began weeks ago, but has since resolved. \par It was moderate in severity. Nothing makes the symptoms better, nothing makes sx worse though patient thinks it may have been associated with a Protein Shake diet she was doing at the time. It is associated with nothing. Recent renal US showed increased LEFT hydronephrosis. UA (6/2019) showed 6 RBCs/hpf. She had a ureteroscopy several years ago for obstruction of her duplicate ureter.  \par No hematuria, no dysuria, no frequency, no urgency, no hesitancy, no straining. No incontinence. \par No fevers, no chills, no nausea, no vomiting. Cr 0.88 (9/2019).No family history contributory to LEFT hydronephrosis. \par \par 11/08/2019: Patient presents for follow up. She obtained CTU and is here to discuss results. She denies any new  symptoms and other medical  issues remain unchanged from above. No hematuria, no dysuria, no frequency, no urgency, no hesitancy, no straining. No incontinence. No fevers, no chills, no nausea, no vomiting, no flank pain. \par \par CTU (11/1/19): LEFT duplicated renal collecting system, LEFT hydronephrosis and proximal hydroureter, with abrupt transition from dilated to collapsed in proximal ureter.

## 2019-11-18 NOTE — ASSESSMENT
[FreeTextEntry1] : 63 yo F with LEFT hydronephrosis with duplicated system and history of ureteral obstruction. CTU shows LEFT hydroureter with abrupt transition point. We discussed options at this time include trending Cr and observation, or ureteroscopy with possible dilation or incision of stricture. Pt wishes to think about it.

## 2019-11-19 ENCOUNTER — TRANSCRIPTION ENCOUNTER (OUTPATIENT)
Age: 64
End: 2019-11-19

## 2019-11-21 ENCOUNTER — FORM ENCOUNTER (OUTPATIENT)
Age: 64
End: 2019-11-21

## 2019-11-22 ENCOUNTER — APPOINTMENT (OUTPATIENT)
Dept: ULTRASOUND IMAGING | Facility: CLINIC | Age: 64
End: 2019-11-22
Payer: COMMERCIAL

## 2019-11-22 ENCOUNTER — OUTPATIENT (OUTPATIENT)
Dept: OUTPATIENT SERVICES | Facility: HOSPITAL | Age: 64
LOS: 1 days | End: 2019-11-22
Payer: COMMERCIAL

## 2019-11-22 DIAGNOSIS — Z90.49 ACQUIRED ABSENCE OF OTHER SPECIFIED PARTS OF DIGESTIVE TRACT: Chronic | ICD-10-CM

## 2019-11-22 DIAGNOSIS — E04.1 NONTOXIC SINGLE THYROID NODULE: ICD-10-CM

## 2019-11-22 PROCEDURE — 76536 US EXAM OF HEAD AND NECK: CPT | Mod: 26

## 2019-11-22 PROCEDURE — 76536 US EXAM OF HEAD AND NECK: CPT

## 2019-11-27 ENCOUNTER — CLINICAL ADVICE (OUTPATIENT)
Age: 64
End: 2019-11-27

## 2019-12-18 ENCOUNTER — OTHER (OUTPATIENT)
Age: 64
End: 2019-12-18

## 2020-01-09 ENCOUNTER — FORM ENCOUNTER (OUTPATIENT)
Age: 65
End: 2020-01-09

## 2020-01-10 ENCOUNTER — APPOINTMENT (OUTPATIENT)
Dept: FAMILY MEDICINE | Facility: CLINIC | Age: 65
End: 2020-01-10
Payer: COMMERCIAL

## 2020-01-10 ENCOUNTER — OUTPATIENT (OUTPATIENT)
Dept: OUTPATIENT SERVICES | Facility: HOSPITAL | Age: 65
LOS: 1 days | End: 2020-01-10
Payer: COMMERCIAL

## 2020-01-10 VITALS
TEMPERATURE: 98 F | OXYGEN SATURATION: 98 % | WEIGHT: 182.98 LBS | SYSTOLIC BLOOD PRESSURE: 142 MMHG | RESPIRATION RATE: 16 BRPM | DIASTOLIC BLOOD PRESSURE: 74 MMHG | HEART RATE: 93 BPM | HEIGHT: 64 IN

## 2020-01-10 VITALS
HEART RATE: 90 BPM | HEIGHT: 64 IN | WEIGHT: 184 LBS | BODY MASS INDEX: 31.41 KG/M2 | SYSTOLIC BLOOD PRESSURE: 120 MMHG | OXYGEN SATURATION: 98 % | DIASTOLIC BLOOD PRESSURE: 70 MMHG

## 2020-01-10 VITALS — SYSTOLIC BLOOD PRESSURE: 134 MMHG | DIASTOLIC BLOOD PRESSURE: 80 MMHG

## 2020-01-10 DIAGNOSIS — Z01.818 ENCOUNTER FOR OTHER PREPROCEDURAL EXAMINATION: ICD-10-CM

## 2020-01-10 DIAGNOSIS — Z98.890 OTHER SPECIFIED POSTPROCEDURAL STATES: Chronic | ICD-10-CM

## 2020-01-10 DIAGNOSIS — Z90.49 ACQUIRED ABSENCE OF OTHER SPECIFIED PARTS OF DIGESTIVE TRACT: Chronic | ICD-10-CM

## 2020-01-10 DIAGNOSIS — Z90.09 ACQUIRED ABSENCE OF OTHER PART OF HEAD AND NECK: Chronic | ICD-10-CM

## 2020-01-10 DIAGNOSIS — N20.0 CALCULUS OF KIDNEY: ICD-10-CM

## 2020-01-10 LAB
ANION GAP SERPL CALC-SCNC: 4 MMOL/L — LOW (ref 5–17)
APPEARANCE UR: CLEAR — SIGNIFICANT CHANGE UP
APTT BLD: 30.2 SEC — SIGNIFICANT CHANGE UP (ref 27.5–36.3)
BASOPHILS # BLD AUTO: 0.03 K/UL — SIGNIFICANT CHANGE UP (ref 0–0.2)
BASOPHILS NFR BLD AUTO: 0.7 % — SIGNIFICANT CHANGE UP (ref 0–2)
BILIRUB UR-MCNC: NEGATIVE — SIGNIFICANT CHANGE UP
BUN SERPL-MCNC: 14 MG/DL — SIGNIFICANT CHANGE UP (ref 7–23)
CALCIUM SERPL-MCNC: 8.8 MG/DL — SIGNIFICANT CHANGE UP (ref 8.5–10.1)
CHLORIDE SERPL-SCNC: 105 MMOL/L — SIGNIFICANT CHANGE UP (ref 96–108)
CO2 SERPL-SCNC: 28 MMOL/L — SIGNIFICANT CHANGE UP (ref 22–31)
COLOR SPEC: YELLOW — SIGNIFICANT CHANGE UP
CREAT SERPL-MCNC: 0.93 MG/DL — SIGNIFICANT CHANGE UP (ref 0.5–1.3)
DIFF PNL FLD: ABNORMAL
EOSINOPHIL # BLD AUTO: 0.05 K/UL — SIGNIFICANT CHANGE UP (ref 0–0.5)
EOSINOPHIL NFR BLD AUTO: 1.2 % — SIGNIFICANT CHANGE UP (ref 0–6)
GLUCOSE SERPL-MCNC: 104 MG/DL — HIGH (ref 70–99)
GLUCOSE UR QL: NEGATIVE MG/DL — SIGNIFICANT CHANGE UP
HCT VFR BLD CALC: 39.4 % — SIGNIFICANT CHANGE UP (ref 34.5–45)
HGB BLD-MCNC: 13.3 G/DL — SIGNIFICANT CHANGE UP (ref 11.5–15.5)
IMM GRANULOCYTES NFR BLD AUTO: 0.2 % — SIGNIFICANT CHANGE UP (ref 0–1.5)
INR BLD: 1.02 RATIO — SIGNIFICANT CHANGE UP (ref 0.88–1.16)
KETONES UR-MCNC: NEGATIVE — SIGNIFICANT CHANGE UP
LEUKOCYTE ESTERASE UR-ACNC: NEGATIVE — SIGNIFICANT CHANGE UP
LYMPHOCYTES # BLD AUTO: 0.99 K/UL — LOW (ref 1–3.3)
LYMPHOCYTES # BLD AUTO: 23.5 % — SIGNIFICANT CHANGE UP (ref 13–44)
MCHC RBC-ENTMCNC: 31.3 PG — SIGNIFICANT CHANGE UP (ref 27–34)
MCHC RBC-ENTMCNC: 33.8 GM/DL — SIGNIFICANT CHANGE UP (ref 32–36)
MCV RBC AUTO: 92.7 FL — SIGNIFICANT CHANGE UP (ref 80–100)
MONOCYTES # BLD AUTO: 0.28 K/UL — SIGNIFICANT CHANGE UP (ref 0–0.9)
MONOCYTES NFR BLD AUTO: 6.7 % — SIGNIFICANT CHANGE UP (ref 2–14)
NEUTROPHILS # BLD AUTO: 2.85 K/UL — SIGNIFICANT CHANGE UP (ref 1.8–7.4)
NEUTROPHILS NFR BLD AUTO: 67.7 % — SIGNIFICANT CHANGE UP (ref 43–77)
NITRITE UR-MCNC: NEGATIVE — SIGNIFICANT CHANGE UP
PH UR: 6.5 — SIGNIFICANT CHANGE UP (ref 5–8)
PLATELET # BLD AUTO: 264 K/UL — SIGNIFICANT CHANGE UP (ref 150–400)
POTASSIUM SERPL-MCNC: 4.4 MMOL/L — SIGNIFICANT CHANGE UP (ref 3.5–5.3)
POTASSIUM SERPL-SCNC: 4.4 MMOL/L — SIGNIFICANT CHANGE UP (ref 3.5–5.3)
PROT UR-MCNC: NEGATIVE MG/DL — SIGNIFICANT CHANGE UP
PROTHROM AB SERPL-ACNC: 11.3 SEC — SIGNIFICANT CHANGE UP (ref 10–12.9)
RBC # BLD: 4.25 M/UL — SIGNIFICANT CHANGE UP (ref 3.8–5.2)
RBC # FLD: 12.4 % — SIGNIFICANT CHANGE UP (ref 10.3–14.5)
SODIUM SERPL-SCNC: 137 MMOL/L — SIGNIFICANT CHANGE UP (ref 135–145)
SP GR SPEC: 1 — LOW (ref 1.01–1.02)
UROBILINOGEN FLD QL: NEGATIVE MG/DL — SIGNIFICANT CHANGE UP
WBC # BLD: 4.21 K/UL — SIGNIFICANT CHANGE UP (ref 3.8–10.5)
WBC # FLD AUTO: 4.21 K/UL — SIGNIFICANT CHANGE UP (ref 3.8–10.5)

## 2020-01-10 PROCEDURE — 81001 URINALYSIS AUTO W/SCOPE: CPT

## 2020-01-10 PROCEDURE — 80048 BASIC METABOLIC PNL TOTAL CA: CPT

## 2020-01-10 PROCEDURE — 93005 ELECTROCARDIOGRAM TRACING: CPT

## 2020-01-10 PROCEDURE — 85730 THROMBOPLASTIN TIME PARTIAL: CPT

## 2020-01-10 PROCEDURE — 85610 PROTHROMBIN TIME: CPT

## 2020-01-10 PROCEDURE — 71046 X-RAY EXAM CHEST 2 VIEWS: CPT | Mod: 26

## 2020-01-10 PROCEDURE — 71046 X-RAY EXAM CHEST 2 VIEWS: CPT

## 2020-01-10 PROCEDURE — 87086 URINE CULTURE/COLONY COUNT: CPT

## 2020-01-10 PROCEDURE — G0463: CPT | Mod: 25

## 2020-01-10 PROCEDURE — 86901 BLOOD TYPING SEROLOGIC RH(D): CPT

## 2020-01-10 PROCEDURE — 86900 BLOOD TYPING SEROLOGIC ABO: CPT

## 2020-01-10 PROCEDURE — 86850 RBC ANTIBODY SCREEN: CPT

## 2020-01-10 PROCEDURE — 85025 COMPLETE CBC W/AUTO DIFF WBC: CPT

## 2020-01-10 PROCEDURE — 99214 OFFICE O/P EST MOD 30 MIN: CPT

## 2020-01-10 PROCEDURE — 93010 ELECTROCARDIOGRAM REPORT: CPT

## 2020-01-10 PROCEDURE — 36415 COLL VENOUS BLD VENIPUNCTURE: CPT

## 2020-01-10 NOTE — H&P PST ADULT - ASSESSMENT
64 year old female presents to PST for planned cystoscopy    Plan:  1. PST instructions given ; NPO post midnight   2. Pt instructed to take following meds with sip of water : synthroid   3. Labs EKG CXR as per surgeon request   4. Medical Optimization  with Dr Nilam Huff   5. Stop NSAIDS ( Aspirin Alev Motrin Mobic Diclofenac), herbal supplements , MVI , Vitamin fish oil 7 days prior to surgery  unless directed by surgeon or cardiologist;

## 2020-01-10 NOTE — H&P PST ADULT - NSICDXPASTSURGICALHX_GEN_ALL_CORE_FT
PAST SURGICAL HISTORY:  H/O parathyroidectomy 2016    S/P appendectomy 1970 PAST SURGICAL HISTORY:  H/O parathyroidectomy 2016    H/O ureterostomy 2010    S/P appendectomy 1970

## 2020-01-10 NOTE — PHYSICAL EXAM
[No Acute Distress] : no acute distress [Well Nourished] : well nourished [Well Developed] : well developed [Well-Appearing] : well-appearing [Normal Sclera/Conjunctiva] : normal sclera/conjunctiva [PERRL] : pupils equal round and reactive to light [EOMI] : extraocular movements intact [Normal Outer Ear/Nose] : the outer ears and nose were normal in appearance [Normal Oropharynx] : the oropharynx was normal [No JVD] : no jugular venous distention [No Lymphadenopathy] : no lymphadenopathy [Supple] : supple [Thyroid Normal, No Nodules] : the thyroid was normal and there were no nodules present [No Respiratory Distress] : no respiratory distress  [No Accessory Muscle Use] : no accessory muscle use [Clear to Auscultation] : lungs were clear to auscultation bilaterally [Normal Rate] : normal rate  [Regular Rhythm] : with a regular rhythm [Normal S1, S2] : normal S1 and S2 [No Murmur] : no murmur heard [No Carotid Bruits] : no carotid bruits [No Abdominal Bruit] : a ~M bruit was not heard ~T in the abdomen [No Varicosities] : no varicosities [Pedal Pulses Present] : the pedal pulses are present [No Edema] : there was no peripheral edema [No Palpable Aorta] : no palpable aorta [No Extremity Clubbing/Cyanosis] : no extremity clubbing/cyanosis [Soft] : abdomen soft [Non-distended] : non-distended [No Masses] : no abdominal mass palpated [Non Tender] : non-tender [No HSM] : no HSM [Normal Bowel Sounds] : normal bowel sounds [Normal Anterior Cervical Nodes] : no anterior cervical lymphadenopathy [Normal Posterior Cervical Nodes] : no posterior cervical lymphadenopathy [No CVA Tenderness] : no CVA  tenderness [No Joint Swelling] : no joint swelling [No Spinal Tenderness] : no spinal tenderness [Grossly Normal Strength/Tone] : grossly normal strength/tone [No Rash] : no rash [Coordination Grossly Intact] : coordination grossly intact [No Focal Deficits] : no focal deficits [Normal Gait] : normal gait [Deep Tendon Reflexes (DTR)] : deep tendon reflexes were 2+ and symmetric [Normal Affect] : the affect was normal [Normal Insight/Judgement] : insight and judgment were intact

## 2020-01-10 NOTE — H&P PST ADULT - NSICDXPASTMEDICALHX_GEN_ALL_CORE_FT
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

## 2020-01-10 NOTE — H&P PST ADULT - NSICDXFAMILYHX_GEN_ALL_CORE_FT
FAMILY HISTORY:  Family history of lip cancer, maternal grandmother  FH: CHF (congestive heart failure), mother  FH: colon cancer, paternal grandfather  FH: kidney disease, mother  FH: lung cancer, mesothelioma father

## 2020-01-10 NOTE — H&P PST ADULT - NSANTHOSAYNRD_GEN_A_CORE
No. JOSSELYN screening performed.  STOP BANG Legend: 0-2 = LOW Risk; 3-4 = INTERMEDIATE Risk; 5-8 = HIGH Risk

## 2020-01-10 NOTE — H&P PST ADULT - HISTORY OF PRESENT ILLNESS
64 year old female presents to PST for planned cystoscopy left ureteroscopy 64 year old female with recurrence ureteral stricture; Pt said she had ureteroscopy in the past; however in November 2019 she developed flank pain; she presents to PST for planned cystoscopy left ureteroscopy and treatment of ureteral stricture

## 2020-01-11 DIAGNOSIS — N20.0 CALCULUS OF KIDNEY: ICD-10-CM

## 2020-01-11 DIAGNOSIS — Z01.818 ENCOUNTER FOR OTHER PREPROCEDURAL EXAMINATION: ICD-10-CM

## 2020-01-11 LAB
CULTURE RESULTS: NO GROWTH — SIGNIFICANT CHANGE UP
SPECIMEN SOURCE: SIGNIFICANT CHANGE UP

## 2020-01-16 ENCOUNTER — FORM ENCOUNTER (OUTPATIENT)
Age: 65
End: 2020-01-16

## 2020-01-16 RX ORDER — FENTANYL CITRATE 50 UG/ML
50 INJECTION INTRAVENOUS
Refills: 0 | Status: DISCONTINUED | OUTPATIENT
Start: 2020-01-17 | End: 2020-01-17

## 2020-01-16 RX ORDER — HYDROMORPHONE HYDROCHLORIDE 2 MG/ML
0.5 INJECTION INTRAMUSCULAR; INTRAVENOUS; SUBCUTANEOUS
Refills: 0 | Status: DISCONTINUED | OUTPATIENT
Start: 2020-01-17 | End: 2020-01-17

## 2020-01-16 RX ORDER — SODIUM CHLORIDE 9 MG/ML
1000 INJECTION INTRAMUSCULAR; INTRAVENOUS; SUBCUTANEOUS
Refills: 0 | Status: DISCONTINUED | OUTPATIENT
Start: 2020-01-17 | End: 2020-01-17

## 2020-01-16 RX ORDER — OXYCODONE HYDROCHLORIDE 5 MG/1
10 TABLET ORAL ONCE
Refills: 0 | Status: DISCONTINUED | OUTPATIENT
Start: 2020-01-17 | End: 2020-01-17

## 2020-01-16 NOTE — END OF VISIT
[FreeTextEntry3] : Medical record entries made by the scribe today today, were at my direction and personally dictated to them by me, Dr. Nilam Huff on Aki 10, 2020. I have reviewed the chart and agree that the record accurately reflects my personal performance of the history, physical exam, assessment, and plan.\par \par

## 2020-01-16 NOTE — ADDENDUM
[FreeTextEntry1] : I, Cristina Felix acting as a scribe for Dr. Nilam Huff on Aki 10, 2020  at 2:16 PM\par

## 2020-01-16 NOTE — ASSESSMENT
[No Further Testing Recommended] : no further testing recommended [Patient Optimized for Surgery] : Patient optimized for surgery [FreeTextEntry4] : Reviewed hx with patient, no contraindications. Pt is medically stable.

## 2020-01-16 NOTE — HISTORY OF PRESENT ILLNESS
[No Pertinent Cardiac History] : no history of aortic stenosis, atrial fibrillation, coronary artery disease, recent myocardial infarction, or implantable device/pacemaker [No Pertinent Pulmonary History] : no history of asthma, COPD, sleep apnea, or smoking [No Adverse Anesthesia Reaction] : no adverse anesthesia reaction in self or family member [(Patient denies any chest pain, claudication, dyspnea on exertion, orthopnea, palpitations or syncope)] : Patient denies any chest pain, claudication, dyspnea on exertion, orthopnea, palpitations or syncope [Chronic Anticoagulation] : no chronic anticoagulation [Chronic Kidney Disease] : no chronic kidney disease [Diabetes] : no diabetes [FreeTextEntry3] : Dr. Farley [FreeTextEntry2] : 1/17/20 [FreeTextEntry1] : CYSTO, LEFT URETEROSCOPY, TREATMENT OF URETERAL STRICTURE, LEFT URETERAL STENT [FreeTextEntry4] : MARISSA is a 64 year female here for [facility]. Pt is having Cystoscopy/LT Ureteroscopy/Treatment of Ureteral Stricture/Possible LT ureteral stent 1/17/20 with  at [facility]. Reports 10 years ago it was found that she has 2 ureters coming off LT kidney. States one ureter was unclogged/stented for 3 weeks. States that flank pain returned in September. Pt has no c/o. Denies dysuria.

## 2020-01-17 ENCOUNTER — OUTPATIENT (OUTPATIENT)
Dept: INPATIENT UNIT | Facility: HOSPITAL | Age: 65
LOS: 1 days | Discharge: HOME | End: 2020-01-17
Payer: COMMERCIAL

## 2020-01-17 ENCOUNTER — APPOINTMENT (OUTPATIENT)
Dept: UROLOGY | Facility: HOSPITAL | Age: 65
End: 2020-01-17

## 2020-01-17 VITALS
OXYGEN SATURATION: 98 % | SYSTOLIC BLOOD PRESSURE: 144 MMHG | RESPIRATION RATE: 16 BRPM | DIASTOLIC BLOOD PRESSURE: 71 MMHG | HEIGHT: 64 IN | WEIGHT: 182.98 LBS | HEART RATE: 78 BPM | TEMPERATURE: 98 F

## 2020-01-17 VITALS
SYSTOLIC BLOOD PRESSURE: 149 MMHG | DIASTOLIC BLOOD PRESSURE: 79 MMHG | HEART RATE: 68 BPM | TEMPERATURE: 97 F | RESPIRATION RATE: 15 BRPM | OXYGEN SATURATION: 100 %

## 2020-01-17 DIAGNOSIS — N20.0 CALCULUS OF KIDNEY: ICD-10-CM

## 2020-01-17 DIAGNOSIS — Z90.09 ACQUIRED ABSENCE OF OTHER PART OF HEAD AND NECK: Chronic | ICD-10-CM

## 2020-01-17 DIAGNOSIS — Z98.890 OTHER SPECIFIED POSTPROCEDURAL STATES: Chronic | ICD-10-CM

## 2020-01-17 DIAGNOSIS — Z90.49 ACQUIRED ABSENCE OF OTHER SPECIFIED PARTS OF DIGESTIVE TRACT: Chronic | ICD-10-CM

## 2020-01-17 PROCEDURE — C2617: CPT

## 2020-01-17 PROCEDURE — 52345 CYSTO/URETERO W/UP STRICTURE: CPT | Mod: LT

## 2020-01-17 PROCEDURE — C1889: CPT

## 2020-01-17 PROCEDURE — C1769: CPT

## 2020-01-17 PROCEDURE — 76000 FLUOROSCOPY <1 HR PHYS/QHP: CPT

## 2020-01-17 RX ORDER — PREGABALIN 225 MG/1
0 CAPSULE ORAL
Qty: 0 | Refills: 0 | DISCHARGE

## 2020-01-17 RX ORDER — OXYBUTYNIN CHLORIDE 5 MG
5 TABLET ORAL ONCE
Refills: 0 | Status: DISCONTINUED | OUTPATIENT
Start: 2020-01-17 | End: 2020-01-17

## 2020-01-17 RX ORDER — PHENAZOPYRIDINE HCL 100 MG
200 TABLET ORAL ONCE
Refills: 0 | Status: DISCONTINUED | OUTPATIENT
Start: 2020-01-17 | End: 2020-01-17

## 2020-01-17 RX ORDER — LEVOTHYROXINE SODIUM 125 MCG
1 TABLET ORAL
Qty: 0 | Refills: 0 | DISCHARGE

## 2020-01-17 RX ORDER — PHENAZOPYRIDINE HCL 100 MG
1 TABLET ORAL
Qty: 9 | Refills: 0
Start: 2020-01-17 | End: 2020-01-19

## 2020-01-17 RX ORDER — IBUPROFEN 200 MG
1 TABLET ORAL
Qty: 20 | Refills: 0
Start: 2020-01-17 | End: 2020-01-21

## 2020-01-17 RX ORDER — TAMSULOSIN HYDROCHLORIDE 0.4 MG/1
1 CAPSULE ORAL
Qty: 20 | Refills: 0
Start: 2020-01-17 | End: 2020-02-05

## 2020-01-17 RX ORDER — CIPROFLOXACIN LACTATE 400MG/40ML
1 VIAL (ML) INTRAVENOUS
Qty: 10 | Refills: 0
Start: 2020-01-17 | End: 2020-01-21

## 2020-01-17 RX ORDER — OMEGA-3 ACID ETHYL ESTERS 1 G
0 CAPSULE ORAL
Qty: 0 | Refills: 0 | DISCHARGE

## 2020-01-17 RX ORDER — OXYBUTYNIN CHLORIDE 5 MG
1 TABLET ORAL
Qty: 30 | Refills: 0
Start: 2020-01-17 | End: 2020-01-26

## 2020-01-17 NOTE — BRIEF OPERATIVE NOTE - NSICDXBRIEFPROCEDURE_GEN_ALL_CORE_FT
PROCEDURES:  Endopyelotomy 17-Jan-2020 11:12:47 cystoscopy, LEFT ureteroscopy, LEFT retrograde pyelogram, LEFT laser endopyelotomy, LEFT ureteral stent Bandar Muñoz

## 2020-01-17 NOTE — ASU PATIENT PROFILE, ADULT - PMH
Hydronephrosis    Hypothyroidism    Primary hyperparathyroidism    Ureteral stricture, left  duplicated ureter Hydronephrosis    Hypothyroidism    Primary hyperparathyroidism    RBBB (right bundle branch block)    Ureteral stricture, left  duplicated ureter

## 2020-01-17 NOTE — BRIEF OPERATIVE NOTE - NSICDXBRIEFPREOP_GEN_ALL_CORE_FT
PRE-OP DIAGNOSIS:  Ureteropelvic junction (UPJ) obstruction, left 17-Jan-2020 11:14:47  Bandar Farley

## 2020-01-17 NOTE — ASU DISCHARGE PLAN (ADULT/PEDIATRIC) - CALL YOUR DOCTOR IF YOU HAVE ANY OF THE FOLLOWING:
Pain not relieved by Medications/Nausea and vomiting that does not stop/Fever greater than (need to indicate Fahrenheit or Celsius)/Unable to urinate

## 2020-01-17 NOTE — BRIEF OPERATIVE NOTE - OPERATION/FINDINGS
Lower pole ureter met up with upper pole ureter very proximally. The junction was stenotic to the lower pole. Endopyelotomy was performed with holmium laser. Lower pole UPJ was more patent after incision with laser. 8 x 26 LEFT ureteral stent in good position in lower pole hemipelvis

## 2020-01-17 NOTE — ASU DISCHARGE PLAN (ADULT/PEDIATRIC) - CARE PROVIDER_API CALL
Bandar Farley)  Urology  [address] [facility], [address] [place] [address]  Phone: [phone]  Fax: [phone]  Follow Up Time: 1 week

## 2020-01-20 ENCOUNTER — EMERGENCY (EMERGENCY)
Facility: HOSPITAL | Age: 65
LOS: 0 days | Discharge: HOME | End: 2020-01-20
Attending: EMERGENCY MEDICINE
Payer: COMMERCIAL

## 2020-01-20 VITALS
TEMPERATURE: 98 F | SYSTOLIC BLOOD PRESSURE: 146 MMHG | OXYGEN SATURATION: 100 % | DIASTOLIC BLOOD PRESSURE: 59 MMHG | RESPIRATION RATE: 19 BRPM | HEART RATE: 80 BPM

## 2020-01-20 VITALS
DIASTOLIC BLOOD PRESSURE: 66 MMHG | TEMPERATURE: 97 F | HEART RATE: 106 BPM | SYSTOLIC BLOOD PRESSURE: 183 MMHG | WEIGHT: 177.91 LBS | RESPIRATION RATE: 20 BRPM | HEIGHT: 64 IN | OXYGEN SATURATION: 100 %

## 2020-01-20 DIAGNOSIS — Z98.890 OTHER SPECIFIED POSTPROCEDURAL STATES: ICD-10-CM

## 2020-01-20 DIAGNOSIS — Z90.09 ACQUIRED ABSENCE OF OTHER PART OF HEAD AND NECK: Chronic | ICD-10-CM

## 2020-01-20 DIAGNOSIS — N39.0 URINARY TRACT INFECTION, SITE NOT SPECIFIED: ICD-10-CM

## 2020-01-20 DIAGNOSIS — R10.9 UNSPECIFIED ABDOMINAL PAIN: ICD-10-CM

## 2020-01-20 DIAGNOSIS — Z98.890 OTHER SPECIFIED POSTPROCEDURAL STATES: Chronic | ICD-10-CM

## 2020-01-20 DIAGNOSIS — Z90.49 ACQUIRED ABSENCE OF OTHER SPECIFIED PARTS OF DIGESTIVE TRACT: Chronic | ICD-10-CM

## 2020-01-20 DIAGNOSIS — E89.0 POSTPROCEDURAL HYPOTHYROIDISM: ICD-10-CM

## 2020-01-20 DIAGNOSIS — Z88.0 ALLERGY STATUS TO PENICILLIN: ICD-10-CM

## 2020-01-20 PROBLEM — I45.10 UNSPECIFIED RIGHT BUNDLE-BRANCH BLOCK: Chronic | Status: ACTIVE | Noted: 2020-01-17

## 2020-01-20 PROBLEM — N13.30 UNSPECIFIED HYDRONEPHROSIS: Chronic | Status: ACTIVE | Noted: 2020-01-10

## 2020-01-20 PROBLEM — N13.5 CROSSING VESSEL AND STRICTURE OF URETER WITHOUT HYDRONEPHROSIS: Chronic | Status: ACTIVE | Noted: 2020-01-10

## 2020-01-20 LAB
ALBUMIN SERPL ELPH-MCNC: 3.5 G/DL — SIGNIFICANT CHANGE UP (ref 3.3–5)
ALP SERPL-CCNC: 120 U/L — SIGNIFICANT CHANGE UP (ref 40–120)
ALT FLD-CCNC: 74 U/L — SIGNIFICANT CHANGE UP (ref 12–78)
ANION GAP SERPL CALC-SCNC: 5 MMOL/L — SIGNIFICANT CHANGE UP (ref 5–17)
APPEARANCE UR: ABNORMAL
AST SERPL-CCNC: 71 U/L — HIGH (ref 15–37)
BASOPHILS # BLD AUTO: 0.04 K/UL — SIGNIFICANT CHANGE UP (ref 0–0.2)
BASOPHILS NFR BLD AUTO: 0.5 % — SIGNIFICANT CHANGE UP (ref 0–2)
BILIRUB SERPL-MCNC: 0.4 MG/DL — SIGNIFICANT CHANGE UP (ref 0.2–1.2)
BILIRUB UR-MCNC: ABNORMAL
BUN SERPL-MCNC: 25 MG/DL — HIGH (ref 7–23)
CALCIUM SERPL-MCNC: 8.8 MG/DL — SIGNIFICANT CHANGE UP (ref 8.5–10.1)
CHLORIDE SERPL-SCNC: 105 MMOL/L — SIGNIFICANT CHANGE UP (ref 96–108)
CO2 SERPL-SCNC: 26 MMOL/L — SIGNIFICANT CHANGE UP (ref 22–31)
COLOR SPEC: ABNORMAL
CREAT SERPL-MCNC: 0.96 MG/DL — SIGNIFICANT CHANGE UP (ref 0.5–1.3)
DIFF PNL FLD: ABNORMAL
EOSINOPHIL # BLD AUTO: 0.08 K/UL — SIGNIFICANT CHANGE UP (ref 0–0.5)
EOSINOPHIL NFR BLD AUTO: 1 % — SIGNIFICANT CHANGE UP (ref 0–6)
GLUCOSE SERPL-MCNC: 122 MG/DL — HIGH (ref 70–99)
GLUCOSE UR QL: NEGATIVE MG/DL — SIGNIFICANT CHANGE UP
HCT VFR BLD CALC: 38.6 % — SIGNIFICANT CHANGE UP (ref 34.5–45)
HGB BLD-MCNC: 12.6 G/DL — SIGNIFICANT CHANGE UP (ref 11.5–15.5)
IMM GRANULOCYTES NFR BLD AUTO: 0.2 % — SIGNIFICANT CHANGE UP (ref 0–1.5)
KETONES UR-MCNC: NEGATIVE — SIGNIFICANT CHANGE UP
LEUKOCYTE ESTERASE UR-ACNC: ABNORMAL
LYMPHOCYTES # BLD AUTO: 1.55 K/UL — SIGNIFICANT CHANGE UP (ref 1–3.3)
LYMPHOCYTES # BLD AUTO: 18.9 % — SIGNIFICANT CHANGE UP (ref 13–44)
MCHC RBC-ENTMCNC: 30.5 PG — SIGNIFICANT CHANGE UP (ref 27–34)
MCHC RBC-ENTMCNC: 32.6 GM/DL — SIGNIFICANT CHANGE UP (ref 32–36)
MCV RBC AUTO: 93.5 FL — SIGNIFICANT CHANGE UP (ref 80–100)
MONOCYTES # BLD AUTO: 0.58 K/UL — SIGNIFICANT CHANGE UP (ref 0–0.9)
MONOCYTES NFR BLD AUTO: 7.1 % — SIGNIFICANT CHANGE UP (ref 2–14)
NEUTROPHILS # BLD AUTO: 5.95 K/UL — SIGNIFICANT CHANGE UP (ref 1.8–7.4)
NEUTROPHILS NFR BLD AUTO: 72.3 % — SIGNIFICANT CHANGE UP (ref 43–77)
NITRITE UR-MCNC: POSITIVE
PH UR: 5 — SIGNIFICANT CHANGE UP (ref 5–8)
PLATELET # BLD AUTO: 272 K/UL — SIGNIFICANT CHANGE UP (ref 150–400)
POTASSIUM SERPL-MCNC: 3.9 MMOL/L — SIGNIFICANT CHANGE UP (ref 3.5–5.3)
POTASSIUM SERPL-SCNC: 3.9 MMOL/L — SIGNIFICANT CHANGE UP (ref 3.5–5.3)
PROT SERPL-MCNC: 7.3 GM/DL — SIGNIFICANT CHANGE UP (ref 6–8.3)
PROT UR-MCNC: 100 MG/DL
RBC # BLD: 4.13 M/UL — SIGNIFICANT CHANGE UP (ref 3.8–5.2)
RBC # FLD: 12.2 % — SIGNIFICANT CHANGE UP (ref 10.3–14.5)
SODIUM SERPL-SCNC: 136 MMOL/L — SIGNIFICANT CHANGE UP (ref 135–145)
SP GR SPEC: 1.01 — SIGNIFICANT CHANGE UP (ref 1.01–1.02)
UROBILINOGEN FLD QL: 4 MG/DL
WBC # BLD: 8.22 K/UL — SIGNIFICANT CHANGE UP (ref 3.8–10.5)
WBC # FLD AUTO: 8.22 K/UL — SIGNIFICANT CHANGE UP (ref 3.8–10.5)

## 2020-01-20 PROCEDURE — 87086 URINE CULTURE/COLONY COUNT: CPT

## 2020-01-20 PROCEDURE — 99284 EMERGENCY DEPT VISIT MOD MDM: CPT | Mod: 25

## 2020-01-20 PROCEDURE — 76770 US EXAM ABDO BACK WALL COMP: CPT | Mod: 26

## 2020-01-20 PROCEDURE — 96374 THER/PROPH/DIAG INJ IV PUSH: CPT

## 2020-01-20 PROCEDURE — 36415 COLL VENOUS BLD VENIPUNCTURE: CPT

## 2020-01-20 PROCEDURE — 85025 COMPLETE CBC W/AUTO DIFF WBC: CPT

## 2020-01-20 PROCEDURE — 76770 US EXAM ABDO BACK WALL COMP: CPT

## 2020-01-20 PROCEDURE — 99284 EMERGENCY DEPT VISIT MOD MDM: CPT

## 2020-01-20 PROCEDURE — 81001 URINALYSIS AUTO W/SCOPE: CPT

## 2020-01-20 PROCEDURE — 80053 COMPREHEN METABOLIC PANEL: CPT

## 2020-01-20 RX ORDER — KETOROLAC TROMETHAMINE 30 MG/ML
15 SYRINGE (ML) INJECTION ONCE
Refills: 0 | Status: DISCONTINUED | OUTPATIENT
Start: 2020-01-20 | End: 2020-01-20

## 2020-01-20 RX ORDER — CEPHALEXIN 500 MG
1 CAPSULE ORAL
Qty: 14 | Refills: 0
Start: 2020-01-20 | End: 2020-01-26

## 2020-01-20 RX ORDER — CEFTRIAXONE 500 MG/1
1000 INJECTION, POWDER, FOR SOLUTION INTRAMUSCULAR; INTRAVENOUS ONCE
Refills: 0 | Status: COMPLETED | OUTPATIENT
Start: 2020-01-20 | End: 2020-01-20

## 2020-01-20 RX ORDER — SODIUM CHLORIDE 9 MG/ML
1000 INJECTION INTRAMUSCULAR; INTRAVENOUS; SUBCUTANEOUS ONCE
Refills: 0 | Status: COMPLETED | OUTPATIENT
Start: 2020-01-20 | End: 2020-01-20

## 2020-01-20 RX ADMIN — CEFTRIAXONE 1000 MILLIGRAM(S): 500 INJECTION, POWDER, FOR SOLUTION INTRAMUSCULAR; INTRAVENOUS at 05:23

## 2020-01-20 RX ADMIN — SODIUM CHLORIDE 1000 MILLILITER(S): 9 INJECTION INTRAMUSCULAR; INTRAVENOUS; SUBCUTANEOUS at 02:17

## 2020-01-20 NOTE — ED PROVIDER NOTE - CONSTITUTIONAL, MLM
normal... Well appearing, awake, alert, oriented to person, place, time/situation; appears mildly uncomfortable.

## 2020-01-20 NOTE — ED PROVIDER NOTE - PATIENT PORTAL LINK FT
You can access the FollowMyHealth Patient Portal offered by [facility] by registering at the following website: [email or web address]. By joining [employer]’s FollowMyHealth portal, you will also be able to view your health information using other applications (apps) compatible with our system.

## 2020-01-20 NOTE — ED PROVIDER NOTE - PROGRESS NOTE DETAILS
d/w Dr Farley.  would like pt abx switched to cephalosporin and pt to f/u in office.  d/w pt , agrees with plan.  pt currently pain free

## 2020-01-20 NOTE — ED ADULT NURSE NOTE - OBJECTIVE STATEMENT
Patient complaining of left flank pain since 930pm tonight.  Had a stent placed on Friday.  Has been taking 300mg of motrin, last dose at 12, the full dose of 600 was giving her palpitations.  Patient is in obvious discomfort and has noticed more blood than usual in her urine.

## 2020-01-20 NOTE — ED PROVIDER NOTE - NSFOLLOWUPINSTRUCTIONS_ED_ALL_ED_FT
Follow up with Dr Farley today  Stop the cipro  Keflex 500 mg twice daily  Return to ED for any further concerns  ibuprofen 600mg every 6 hours for pain    Urinary Tract Infection    A urinary tract infection (UTI) is an infection of any part of the urinary tract, which includes the kidneys, ureters, bladder, and urethra. Risk factors include ignoring your need to urinate, wiping back to front if female, being an uncircumcised male, and having diabetes or a weak immune system. Symptoms include frequent urination, pain or burning with urination, foul smelling urine, cloudy urine, pain in the lower abdomen, blood in the urine, and fever. If you were prescribed an antibiotic medicine, take it as told by your health care provider. Do not stop taking the antibiotic even if you start to feel better.    SEEK IMMEDIATE MEDICAL CARE IF YOU HAVE ANY OF THE FOLLOWING SYMPTOMS: severe back or abdominal pain, fever, inability to keep fluids or medicine down, dizziness/lightheadedness, or a change in mental status.

## 2020-01-20 NOTE — ED PROVIDER NOTE - OBJECTIVE STATEMENT
63 yo female with primary hyperparathyroidism, RBBB, ureteral stricture, hydronephrosis. had a left ureteral stent placed 1/17.  Patient c/o acute onset of left flank pain tonight.  On and off, currently less severe.  Mild abdominal radiation.  no n/v.  Patient given ibuprofen 600mg but has only been taking 300mg (breaking in half).  taking the cipro.  No fever.  Urology Dr Farley.

## 2020-01-20 NOTE — ED ADULT TRIAGE NOTE - CHIEF COMPLAINT QUOTE
Had cytoscopy,  Left ureteral stent placement 1/17/20. C/o left flank pain. Took IBU with no relief. Denies urinary/sx

## 2020-01-20 NOTE — ED PROVIDER NOTE - PMH
Hydronephrosis    Hypothyroidism    Primary hyperparathyroidism    RBBB (right bundle branch block)    Ureteral stricture, left  duplicated ureter

## 2020-01-21 LAB
CULTURE RESULTS: NO GROWTH — SIGNIFICANT CHANGE UP
SPECIMEN SOURCE: SIGNIFICANT CHANGE UP

## 2020-01-23 DIAGNOSIS — E55.9 VITAMIN D DEFICIENCY, UNSPECIFIED: ICD-10-CM

## 2020-01-23 DIAGNOSIS — Z88.0 ALLERGY STATUS TO PENICILLIN: ICD-10-CM

## 2020-01-23 DIAGNOSIS — I45.10 UNSPECIFIED RIGHT BUNDLE-BRANCH BLOCK: ICD-10-CM

## 2020-01-23 DIAGNOSIS — E03.9 HYPOTHYROIDISM, UNSPECIFIED: ICD-10-CM

## 2020-01-23 DIAGNOSIS — N13.0 HYDRONEPHROSIS WITH URETEROPELVIC JUNCTION OBSTRUCTION: ICD-10-CM

## 2020-01-23 DIAGNOSIS — I10 ESSENTIAL (PRIMARY) HYPERTENSION: ICD-10-CM

## 2020-01-23 DIAGNOSIS — E78.5 HYPERLIPIDEMIA, UNSPECIFIED: ICD-10-CM

## 2020-01-23 DIAGNOSIS — Z90.49 ACQUIRED ABSENCE OF OTHER SPECIFIED PARTS OF DIGESTIVE TRACT: ICD-10-CM

## 2020-02-07 ENCOUNTER — APPOINTMENT (OUTPATIENT)
Dept: UROLOGY | Facility: CLINIC | Age: 65
End: 2020-02-07
Payer: COMMERCIAL

## 2020-02-07 VITALS
HEART RATE: 83 BPM | BODY MASS INDEX: 31.58 KG/M2 | SYSTOLIC BLOOD PRESSURE: 148 MMHG | DIASTOLIC BLOOD PRESSURE: 83 MMHG | HEIGHT: 64 IN | WEIGHT: 185 LBS

## 2020-02-07 PROCEDURE — 52310 CYSTOSCOPY AND TREATMENT: CPT

## 2020-03-05 LAB — HBA1C MFR BLD HPLC: 5.6

## 2020-03-06 ENCOUNTER — APPOINTMENT (OUTPATIENT)
Dept: ENDOCRINOLOGY | Facility: CLINIC | Age: 65
End: 2020-03-06
Payer: COMMERCIAL

## 2020-03-06 VITALS
WEIGHT: 174 LBS | SYSTOLIC BLOOD PRESSURE: 110 MMHG | BODY MASS INDEX: 29.71 KG/M2 | OXYGEN SATURATION: 98 % | HEIGHT: 64 IN | DIASTOLIC BLOOD PRESSURE: 82 MMHG | HEART RATE: 79 BPM

## 2020-03-06 PROCEDURE — 99214 OFFICE O/P EST MOD 30 MIN: CPT

## 2020-03-06 RX ORDER — BIOTIN 10 MG
TABLET ORAL
Refills: 0 | Status: ACTIVE | COMMUNITY

## 2020-03-06 NOTE — HISTORY OF PRESENT ILLNESS
[FreeTextEntry1] : Interval Hx: no issues \par \par Hypothyroid Dx 2010 on routine blood test, on Synthroid (AKBAR) 88 mcg - adherent with dosing and administration, dose increased June 2019\par Left thyroid nodule DX 2016 on sonogram\par Current sx: denies anterior neck pain, dysphagia or voice changes.\par \par Osteopenia on DXA 2016 and 2018 - h/o primary hyperparathyroidism s/p left inferior parathyroid adenoma removal in 2016 \par Current meds: on Vit D3 1000 units daily. \par current sx: denies back/hip pains. denies falls/fracture\par Dairy intake - no calcium supplements, yogurt daily\par Exercise - walks daily, 1-2x/week, treadmill, picking up/taking care of grandson\par

## 2020-03-06 NOTE — REVIEW OF SYSTEMS
[Recent Weight Gain (___ Lbs)] : recent [unfilled] ~Ulb weight gain [As Noted in HPI] : as noted in HPI [Fatigue] : no fatigue [Blurry Vision] : no blurred vision [Chest Pain] : no chest pain [Palpitations] : no palpitations [Shortness Of Breath] : no shortness of breath [SOB on Exertion] : no shortness of breath during exertion [Constipation] : no constipation [Diarrhea] : no diarrhea [Polyuria] : no polyuria [Nocturia] : no nocturia [Tremors] : no tremors [Depression] : no depression [Anxiety] : no anxiety [Polydipsia] : no polydipsia [Cold Intolerance] : cold tolerant [Heat Intolerance] : heat tolerant

## 2020-03-06 NOTE — PHYSICAL EXAM
[Alert] : alert [No Acute Distress] : no acute distress [Well Nourished] : well nourished [Well Developed] : well developed [Normal Sclera/Conjunctiva] : normal sclera/conjunctiva [EOMI] : extra ocular movement intact [No Neck Mass] : no neck mass was observed [Thyroid Not Enlarged] : the thyroid was not enlarged [Well Healed Scar] : well healed scar [No Thyroid Nodules] : there were no palpable thyroid nodules [Normal Rate and Effort] : normal respiratory rhythm and effort [Clear to Auscultation] : lungs were clear to auscultation bilaterally [Normal Rate] : heart rate was normal  [Normal S1, S2] : normal S1 and S2 [Normal Gait] : normal gait [No Rash] : no rash [Cranial Nerves Intact] : cranial nerves 2-12 were intact [Normal Reflexes] : deep tendon reflexes were 2+ and symmetric [No Tremors] : no tremors [Oriented x3] : oriented to person, place, and time [Normal Insight/Judgement] : insight and judgment were intact [Normal Affect] : the affect was normal [Normal Mood] : the mood was normal

## 2020-03-06 NOTE — ASSESSMENT
[FreeTextEntry1] : 1. Hypothyroid - TSH low\par - decrease Synthroid 88 mcg - 1 tab Mon-Sat, no tab on Sun\par - repeat TFTs 8 weeks\par \par 2. small Left thyroid nodule - stable\par -  repeat sono 11/2020\par \par 3. PreDM - improved A1c\par - cont to monitor\par - cont lifestyle changes\par \par 4. Osteopenia - vit D and calcium okay, asymptomatic\par - DXA 6/2020\par - encouraged weight bearing exercises\par - cont Vit D, increase calcium containing foods (not ice cream due to fasting hyperglycemia)

## 2020-03-06 NOTE — REASON FOR VISIT
[Follow-Up: _____] : a [unfilled] follow-up visit [FreeTextEntry1] : stable hypothyroid, Left thyroid nodule, PreDM, osteopenia

## 2020-03-06 NOTE — DATA REVIEWED
[FreeTextEntry1] : 6/24/16 parathyroid imaging - Left lower pole parathyroid adenoma\par \par DXA 6/3/16 tscores\par L1-L4 -0.4\par LFN -1.4\par RFN -1.5\par L forearm -1.2\par \par DXA 6/2018 Tscores\par L1-L4 -0.3\par Left FN -1.6\par Right FN -1.4\par ------------------------------------------------------------------------------------\par \par thyroid sonogram 6/3/16\par diffusely heterogeneous thyroid\par Left M-L pole hyperechoic nodule 6x5x5 mm\par Inferior to Left thyroid 1.6x.8x.8 cm - lymph node vs parathyroid nodule\par \par 9/21/16 Left parathyroid - inferior - hypercellular\par \par Thyroid songram 11/14/16\par Left MP nodule 7x5x6 mm -stable\par \par Thyroid sonogram 12/2017 - Left thyroid nodule 6x6x7 mm\par \par Thyroid sono 12/2018 diffusely heterogenous gland, 5 mm area not nodule but heterogeneou area\par \par EXAM: US THYROID PARATHYROID \par PROCEDURE DATE: 11/22/2019 \par  \par INTERPRETATION: CLINICAL INFORMATION: Hypothyroidism. Left nodule. On Synthroid. \par COMPARISON: 12/14/2018 \par TECHNIQUE: Sonography of the thyroid. \par FINDINGS: \par Right Lobe: 4.7 x 2.1 x 1.6 cm.. The parenchyma is diffusely heterogeneous. No discrete nodule is \par seen. \par Left Lobe: 4.4 x 1.6 x 1.7 cm. 5 mm hypoechoic area in the midpole of the left thyroid lobe, \par unchanged.. Otherwise, the parenchyma is diffusely heterogeneous. \par Isthmus: 6 mm. \par Cervical Lymph Nodes: No enlarged or abnormal morphology cervical nodes. \par IMPRESSION: \par Diffusely heterogeneous thyroid gland consistent with underlying thyroiditis. Stable small \par hypoechoic area in the midpole of the left thyroid lobe.\par ----------------------------------------------------------------------------------------\par Labs 11/8/19\par Gluc 98\par Cr 0.97, GFR 63\par A1c 5.8%\par FT4 1.53, TSH 0.726\par Ca 9.4, Vit D 39.4\par \par Labs 2/28/20\par Gluc 107, A1c 5.6%\par Cr 0.99, GFR 61\par Ca 9.4, Vit D 43\par TSH 0.251, FT4 1.78\par

## 2020-03-20 ENCOUNTER — RESULT REVIEW (OUTPATIENT)
Age: 65
End: 2020-03-20

## 2020-03-20 ENCOUNTER — APPOINTMENT (OUTPATIENT)
Dept: ULTRASOUND IMAGING | Facility: CLINIC | Age: 65
End: 2020-03-20
Payer: COMMERCIAL

## 2020-03-20 ENCOUNTER — OUTPATIENT (OUTPATIENT)
Dept: OUTPATIENT SERVICES | Facility: HOSPITAL | Age: 65
LOS: 1 days | End: 2020-03-20
Payer: COMMERCIAL

## 2020-03-20 DIAGNOSIS — Z90.09 ACQUIRED ABSENCE OF OTHER PART OF HEAD AND NECK: Chronic | ICD-10-CM

## 2020-03-20 DIAGNOSIS — Z98.890 OTHER SPECIFIED POSTPROCEDURAL STATES: Chronic | ICD-10-CM

## 2020-03-20 DIAGNOSIS — N13.30 UNSPECIFIED HYDRONEPHROSIS: ICD-10-CM

## 2020-03-20 DIAGNOSIS — Z90.49 ACQUIRED ABSENCE OF OTHER SPECIFIED PARTS OF DIGESTIVE TRACT: Chronic | ICD-10-CM

## 2020-03-20 PROCEDURE — 76770 US EXAM ABDO BACK WALL COMP: CPT

## 2020-03-20 PROCEDURE — 76770 US EXAM ABDO BACK WALL COMP: CPT | Mod: 26

## 2020-05-01 ENCOUNTER — APPOINTMENT (OUTPATIENT)
Dept: ENDOCRINOLOGY | Facility: CLINIC | Age: 65
End: 2020-05-01
Payer: COMMERCIAL

## 2020-05-01 DIAGNOSIS — I10 ESSENTIAL (PRIMARY) HYPERTENSION: ICD-10-CM

## 2020-05-01 PROCEDURE — 36415 COLL VENOUS BLD VENIPUNCTURE: CPT

## 2020-05-05 LAB
T4 FREE SERPL-MCNC: 1.5 NG/DL
TSH SERPL-ACNC: 2.36 UIU/ML

## 2020-05-22 ENCOUNTER — APPOINTMENT (OUTPATIENT)
Dept: UROLOGY | Facility: CLINIC | Age: 65
End: 2020-05-22
Payer: COMMERCIAL

## 2020-05-22 ENCOUNTER — RX RENEWAL (OUTPATIENT)
Age: 65
End: 2020-05-22

## 2020-05-22 VITALS — DIASTOLIC BLOOD PRESSURE: 82 MMHG | TEMPERATURE: 98.2 F | SYSTOLIC BLOOD PRESSURE: 136 MMHG

## 2020-05-22 DIAGNOSIS — Q62.5 DUPLICATION OF URETER: ICD-10-CM

## 2020-05-22 PROCEDURE — 99213 OFFICE O/P EST LOW 20 MIN: CPT

## 2020-05-22 NOTE — HISTORY OF PRESENT ILLNESS
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 This began weeks ago, but has since resolved. \par It was moderate in severity. Nothing makes the symptoms better, nothing makes sx worse though patient thinks it may have been associated with a Protein Shake diet she was doing at the time. It is associated with nothing. Recent renal US showed increased LEFT hydronephrosis. UA (6/2019) showed 6 RBCs/hpf. She had a ureteroscopy several years ago for obstruction of her duplicate ureter.  \par No hematuria, no dysuria, no frequency, no urgency, no hesitancy, no straining. No incontinence. \par No fevers, no chills, no nausea, no vomiting. Cr 0.88 (9/2019).No family history contributory to LEFT hydronephrosis. \par \par 11/08/2019: Patient presents for follow up. She obtained CTU and is here to discuss results. She denies any new  symptoms and other medical  issues remain unchanged from above. No hematuria, no dysuria, no frequency, no urgency, no hesitancy, no straining. No incontinence. No fevers, no chills, no nausea, no vomiting, no flank pain.  CTU (11/1/19): LEFT duplicated renal collecting system, LEFT hydronephrosis and proximal hydroureter, with abrupt transition from dilated to collapsed in proximal ureter. \par \par 05/22/2020: Patient presents for follow up. She reports occasional LEFT flank pain, which is positional and mild. No other  symptoms and other medical  issues remain unchanged from above. No hematuria, no dysuria, no frequency, no urgency, no hesitancy, no straining. No incontinence. No fevers, no chills, no nausea, no vomiting. RBUS (3/2020) showed decreased LEFT hydronephrosis. Cr (2/2020) 0.99

## 2020-05-22 NOTE — PHYSICAL EXAM
[General Appearance - Well Developed] : well developed [General Appearance - Well Nourished] : well nourished [Well Groomed] : well groomed [Normal Appearance] : normal appearance [General Appearance - In No Acute Distress] : no acute distress [Abdomen Soft] : soft [Costovertebral Angle Tenderness] : no ~M costovertebral angle tenderness [Urinary Bladder Findings] : the bladder was normal on palpation [Abdomen Tenderness] : non-tender [Edema] : no peripheral edema [] : no respiratory distress [Respiration, Rhythm And Depth] : normal respiratory rhythm and effort [Oriented To Time, Place, And Person] : oriented to person, place, and time [Exaggerated Use Of Accessory Muscles For Inspiration] : no accessory muscle use [Mood] : the mood was normal [Affect] : the affect was normal [Not Anxious] : not anxious [Normal Station and Gait] : the gait and station were normal for the patient's age [No Focal Deficits] : no focal deficits [No Palpable Adenopathy] : no palpable adenopathy

## 2020-05-22 NOTE — ASSESSMENT
[FreeTextEntry1] : 65 yo F with LEFT hydronephrosis with duplicated system and history of ureteral obstruction, s/p endopyelotomy of lower pole moeity. Doing well. Hydro decreasing, Cr stable. Recommend repeat RBUS and Cr in 6 months. She will RTO or Call sooner PRN.

## 2020-06-08 ENCOUNTER — OUTPATIENT (OUTPATIENT)
Dept: OUTPATIENT SERVICES | Facility: HOSPITAL | Age: 65
LOS: 1 days | End: 2020-06-08
Payer: COMMERCIAL

## 2020-06-08 ENCOUNTER — APPOINTMENT (OUTPATIENT)
Dept: RADIOLOGY | Facility: CLINIC | Age: 65
End: 2020-06-08
Payer: COMMERCIAL

## 2020-06-08 DIAGNOSIS — Z90.09 ACQUIRED ABSENCE OF OTHER PART OF HEAD AND NECK: Chronic | ICD-10-CM

## 2020-06-08 DIAGNOSIS — Z13.820 ENCOUNTER FOR SCREENING FOR OSTEOPOROSIS: ICD-10-CM

## 2020-06-08 DIAGNOSIS — Z98.890 OTHER SPECIFIED POSTPROCEDURAL STATES: Chronic | ICD-10-CM

## 2020-06-08 DIAGNOSIS — Z90.49 ACQUIRED ABSENCE OF OTHER SPECIFIED PARTS OF DIGESTIVE TRACT: Chronic | ICD-10-CM

## 2020-06-08 DIAGNOSIS — Z00.8 ENCOUNTER FOR OTHER GENERAL EXAMINATION: ICD-10-CM

## 2020-06-08 PROCEDURE — 77080 DXA BONE DENSITY AXIAL: CPT | Mod: 26

## 2020-06-08 PROCEDURE — 77080 DXA BONE DENSITY AXIAL: CPT

## 2020-06-19 ENCOUNTER — APPOINTMENT (OUTPATIENT)
Dept: FAMILY MEDICINE | Facility: CLINIC | Age: 65
End: 2020-06-19
Payer: COMMERCIAL

## 2020-06-19 ENCOUNTER — NON-APPOINTMENT (OUTPATIENT)
Age: 65
End: 2020-06-19

## 2020-06-19 VITALS
DIASTOLIC BLOOD PRESSURE: 80 MMHG | HEART RATE: 100 BPM | WEIGHT: 184 LBS | HEIGHT: 64 IN | BODY MASS INDEX: 31.41 KG/M2 | TEMPERATURE: 98 F | SYSTOLIC BLOOD PRESSURE: 124 MMHG | OXYGEN SATURATION: 98 %

## 2020-06-19 PROCEDURE — 93000 ELECTROCARDIOGRAM COMPLETE: CPT

## 2020-06-19 PROCEDURE — 99396 PREV VISIT EST AGE 40-64: CPT | Mod: 25

## 2020-06-19 NOTE — PHYSICAL EXAM
[No Acute Distress] : no acute distress [Well Nourished] : well nourished [Well Developed] : well developed [Normal Sclera/Conjunctiva] : normal sclera/conjunctiva [Well-Appearing] : well-appearing [PERRL] : pupils equal round and reactive to light [EOMI] : extraocular movements intact [Normal Outer Ear/Nose] : the outer ears and nose were normal in appearance [Normal Oropharynx] : the oropharynx was normal [No JVD] : no jugular venous distention [No Lymphadenopathy] : no lymphadenopathy [Thyroid Normal, No Nodules] : the thyroid was normal and there were no nodules present [Supple] : supple [No Respiratory Distress] : no respiratory distress  [No Accessory Muscle Use] : no accessory muscle use [Regular Rhythm] : with a regular rhythm [Clear to Auscultation] : lungs were clear to auscultation bilaterally [Normal Rate] : normal rate  [Normal S1, S2] : normal S1 and S2 [No Carotid Bruits] : no carotid bruits [No Murmur] : no murmur heard [No Abdominal Bruit] : a ~M bruit was not heard ~T in the abdomen [No Varicosities] : no varicosities [No Edema] : there was no peripheral edema [Pedal Pulses Present] : the pedal pulses are present [No Palpable Aorta] : no palpable aorta [Soft] : abdomen soft [No Extremity Clubbing/Cyanosis] : no extremity clubbing/cyanosis [Non Tender] : non-tender [No Masses] : no abdominal mass palpated [Non-distended] : non-distended [No HSM] : no HSM [Normal Bowel Sounds] : normal bowel sounds [Normal Anterior Cervical Nodes] : no anterior cervical lymphadenopathy [Normal Posterior Cervical Nodes] : no posterior cervical lymphadenopathy [No CVA Tenderness] : no CVA  tenderness [No Spinal Tenderness] : no spinal tenderness [No Joint Swelling] : no joint swelling [No Rash] : no rash [Grossly Normal Strength/Tone] : grossly normal strength/tone [Coordination Grossly Intact] : coordination grossly intact [No Focal Deficits] : no focal deficits [Normal Gait] : normal gait [Deep Tendon Reflexes (DTR)] : deep tendon reflexes were 2+ and symmetric [Normal Affect] : the affect was normal [Normal Insight/Judgement] : insight and judgment were intact

## 2020-06-19 NOTE — ADDENDUM
[FreeTextEntry1] : I, Cristina Felix acting as a scribe for Dr. Nilam Huff on Jun 19, 2020  at 10:18 AM\par

## 2020-06-19 NOTE — HEALTH RISK ASSESSMENT
[Patient reported bone density results were normal] : Patient reported bone density results were normal [Patient reported mammogram was normal] : Patient reported mammogram was normal [Good] : ~his/her~  mood as  good [None] : None [With Family] : lives with family [Employed] : employed [] :  [Feels Safe at Home] : Feels safe at home [Fully functional (bathing, dressing, toileting, transferring, walking, feeding)] : Fully functional (bathing, dressing, toileting, transferring, walking, feeding) [Fully functional (using the telephone, shopping, preparing meals, housekeeping, doing laundry, using] : Fully functional and needs no help or supervision to perform IADLs (using the telephone, shopping, preparing meals, housekeeping, doing laundry, using transportation, managing medications and managing finances) [MammogramDate] : 04/19 [BoneDensityDate] : 06/18

## 2020-06-19 NOTE — PLAN
[FreeTextEntry1] : - Blood work today \par - Jaw pain likely TMJ\par - Avoid clenching teeth\par - Monitor posture\par - Use warm wash cloth on jaw, rub in circular motion\par - Encouraged neck muscle stretches \par - Encouraged to use Flonase for ear congestion/possible fluid

## 2020-06-19 NOTE — HISTORY OF PRESENT ILLNESS
[FreeTextEntry1] : CPE [de-identified] : MARISSA is a 64 year female here for CPE. Mood is good. Currently taking Multivitamin, Biotin, Vit D 1000 IU, Vit B complex daily. Taking Synthroid 88 mcg x6 week. Pt leads an active lifestyle and eats a well balanced diet. Followed up with Dermatology in the past year, had a spot removed that came back benign. Followed up with urologist  due to LT hydronephrosis with dual ureter. Cr stable, follow up in 6 months. Following up with Endocrinologist  7/10/20 for prediabetes/thyroid nodules/hypothyroid. Pt reports she had a DEXA last week, results showed Osteopenia. Has been taking Vit D/Calcium. Colonoscopy scheduled for next week. Pt reports in the past she had fallen off treadmill. Developed rib pain, felt like the pain was radiating into her chest. Followed up with Cardio, all evaluations were normal. States the pain is now in her jaw.

## 2020-06-19 NOTE — END OF VISIT
[FreeTextEntry3] : Medical record entries made by the scribe today today, were at my direction and personally dictated to them by me, Dr. Nilam Huff on Jun 19, 2020. I have reviewed the chart and agree that the record accurately reflects my personal performance of the history, physical exam, assessment, and plan.\par \par

## 2020-07-09 LAB
25(OH)D3 SERPL-MCNC: 46.4 NG/ML
ALBUMIN SERPL ELPH-MCNC: 4.6 G/DL
ALP BLD-CCNC: 99 U/L
ALT SERPL-CCNC: 18 U/L
ANION GAP SERPL CALC-SCNC: 15 MMOL/L
APPEARANCE: CLEAR
AST SERPL-CCNC: 21 U/L
BACTERIA: NEGATIVE
BASOPHILS # BLD AUTO: 0.04 K/UL
BASOPHILS NFR BLD AUTO: 1 %
BILIRUB SERPL-MCNC: 0.3 MG/DL
BILIRUBIN URINE: NEGATIVE
BLOOD URINE: NEGATIVE
BUN SERPL-MCNC: 18 MG/DL
CALCIUM SERPL-MCNC: 9.5 MG/DL
CHLORIDE SERPL-SCNC: 103 MMOL/L
CHOLEST SERPL-MCNC: 273 MG/DL
CHOLEST/HDLC SERPL: 5.3 RATIO
CO2 SERPL-SCNC: 26 MMOL/L
COLOR: NORMAL
CREAT SERPL-MCNC: 1.01 MG/DL
EOSINOPHIL # BLD AUTO: 0.06 K/UL
EOSINOPHIL NFR BLD AUTO: 1.5 %
ESTIMATED AVERAGE GLUCOSE: 117 MG/DL
GLUCOSE QUALITATIVE U: NEGATIVE
GLUCOSE SERPL-MCNC: 99 MG/DL
HBA1C MFR BLD HPLC: 5.7 %
HBA1C MFR BLD HPLC: 5.8
HCT VFR BLD CALC: 41.8 %
HDLC SERPL-MCNC: 51 MG/DL
HGB BLD-MCNC: 13.4 G/DL
HYALINE CASTS: 1 /LPF
IMM GRANULOCYTES NFR BLD AUTO: 0 %
KETONES URINE: NEGATIVE
LDLC SERPL CALC-MCNC: 179 MG/DL
LEUKOCYTE ESTERASE URINE: NEGATIVE
LYMPHOCYTES # BLD AUTO: 1.21 K/UL
LYMPHOCYTES NFR BLD AUTO: 30.3 %
MAN DIFF?: NORMAL
MCHC RBC-ENTMCNC: 30 PG
MCHC RBC-ENTMCNC: 32.1 GM/DL
MCV RBC AUTO: 93.7 FL
MICROSCOPIC-UA: NORMAL
MONOCYTES # BLD AUTO: 0.32 K/UL
MONOCYTES NFR BLD AUTO: 8 %
NEUTROPHILS # BLD AUTO: 2.37 K/UL
NEUTROPHILS NFR BLD AUTO: 59.2 %
NITRITE URINE: NEGATIVE
PH URINE: 6.5
PLATELET # BLD AUTO: 296 K/UL
POTASSIUM SERPL-SCNC: 4.5 MMOL/L
PROT SERPL-MCNC: 7.2 G/DL
PROTEIN URINE: NEGATIVE
RBC # BLD: 4.46 M/UL
RBC # FLD: 13.1 %
RED BLOOD CELLS URINE: 1 /HPF
SARS-COV-2 IGG SERPL IA-ACNC: 0.27 INDEX
SARS-COV-2 IGG SERPL QL IA: NEGATIVE
SODIUM SERPL-SCNC: 144 MMOL/L
SPECIFIC GRAVITY URINE: 1.01
SQUAMOUS EPITHELIAL CELLS: 0 /HPF
TRIGL SERPL-MCNC: 212 MG/DL
TSH SERPL-ACNC: 1.57 UIU/ML
URATE SERPL-MCNC: 5.4 MG/DL
UROBILINOGEN URINE: NORMAL
WBC # FLD AUTO: 4 K/UL
WHITE BLOOD CELLS URINE: 0 /HPF

## 2020-07-10 ENCOUNTER — APPOINTMENT (OUTPATIENT)
Dept: ENDOCRINOLOGY | Facility: CLINIC | Age: 65
End: 2020-07-10
Payer: COMMERCIAL

## 2020-07-10 VITALS
HEART RATE: 89 BPM | DIASTOLIC BLOOD PRESSURE: 80 MMHG | SYSTOLIC BLOOD PRESSURE: 130 MMHG | BODY MASS INDEX: 30.04 KG/M2 | WEIGHT: 175 LBS | OXYGEN SATURATION: 98 %

## 2020-07-10 PROCEDURE — 99214 OFFICE O/P EST MOD 30 MIN: CPT

## 2020-07-10 NOTE — REASON FOR VISIT
[Follow - Up] : a follow-up visit [Hypothyroidism] : hypothyroidism [Thyroid nodule/ MNG] : thyroid nodule/ MNG [Other___] : [unfilled]

## 2020-07-10 NOTE — DATA REVIEWED
[FreeTextEntry1] : 6/24/16 parathyroid imaging - Left lower pole parathyroid adenoma\par 9/21/16 Path Left inferios parathyroid - hypercellular, enlarged\par =================================================\par DXA 6/3/16 tscores\par L1-L4 -0.4\par LFN -1.4\par RFN -1.5\par L forearm -1.2\par \par DXA 6/2018 Tscores\par L1-L4 -0.3\par Left FN -1.6\par Right FN -1.4\par \par DXA 6/8/20 (done at different facility) Tscores\par L1-L4 -0.6\par Hip -1.1\par FRAX: MOF 7.5%, Hp Fx 0.5%\par ====================================================\par thyroid sonogram 6/3/16\par diffusely heterogeneous thyroid\par Left M-L pole hyperechoic nodule 6x5x5 mm\par Inferior to Left thyroid 1.6x.8x.8 cm - lymph node vs parathyroid nodule\par \par Thyroid songram 11/14/16 Left MP nodule 7x5x6 mm -stable\par \par Thyroid sonogram 12/2017 - Left thyroid nodule 6x6x7 mm\par \par Thyroid sono 12/2018 diffusely heterogenous gland, 5 mm area not nodule but heterogeneou area\par \par EXAM: US THYROID PARATHYROID \par PROCEDURE DATE: 11/22/2019 \par  INTERPRETATION: CLINICAL INFORMATION: Hypothyroidism. Left nodule. On Synthroid. \par COMPARISON: 12/14/2018 \par TECHNIQUE: Sonography of the thyroid. \par FINDINGS: \par Right Lobe: 4.7 x 2.1 x 1.6 cm.. The parenchyma is diffusely heterogeneous. No discrete nodule is \par seen. \par Left Lobe: 4.4 x 1.6 x 1.7 cm. 5 mm hypoechoic area in the midpole of the left thyroid lobe, \par unchanged.. Otherwise, the parenchyma is diffusely heterogeneous. \par Isthmus: 6 mm. \par Cervical Lymph Nodes: No enlarged or abnormal morphology cervical nodes. \par IMPRESSION: \par Diffusely heterogeneous thyroid gland consistent with underlying thyroiditis. Stable small \par hypoechoic area in the midpole of the left thyroid lobe.\par ===============================================\par Labs 11/8/19\par Gluc 98\par Cr 0.97, GFR 63\par A1c 5.8%\par FT4 1.53, TSH 0.726\par Ca 9.4, Vit D 39.4\par \par Labs 2/28/20\par Gluc 107, A1c 5.6%\par Cr 0.99, GFR 61\par Ca 9.4, Vit D 43\par TSH 0.251, FT4 1.78\par \par Labs 7/2/20\par Gluc 104, A1c 5.8\par Cr 0.96, GFR 64\par TSH 2.570\par Vit D 35.6, Ca 9.2\par \par

## 2020-07-10 NOTE — HISTORY OF PRESENT ILLNESS
Message   Recorded as Task   Date: 07/02/2018 06:06 PM, Created By: Hanny Pacheco   Task Name: Nurse Order   Assigned To: Hanny Pacheco   Regarding Patient: JENNIFER DESAI, Status: In Progress   Comment:    Hanny Pacheco - 02 Jul 2018 6:06 PM     TASK CREATED  PT SEEN 06/29/2018 [facility]   I WILL F/U WITH DR BARBOSA UPON HIS RETURN REGARDING IF PT NEEDS TO F/U WITH HIM IN THE FUTURE.  PT CONTACT # [phone]   Hanny Pacheco - 03 Jul 2018 11:49 AM     TASK IN PROGRESS   Hanny Pacheco - 09 Jul 2018 12:39 PM     TASK EDITED  PER DR BARBOSA PT TO F/U PRN.     Signatures   Electronically signed by : Hanny Pacheco L.P.N.; Jul 9 2018  1:47PM CST     [FreeTextEntry1] : Interval Hx: no issues, no changes\par \par Hypothyroid Dx 2010 on routine blood test, on Synthroid (AKBAR) 88 mcg 1tab 6 days/week - adherent with dosing and administration\par Left thyroid nodule DX 2016 on sonogram\par Current sx: denies anterior neck pain, dysphagia, occasional voice hoarseness after long conversation\par \par Osteopenia on DXA 2016 and 2018 - h/o primary hyperparathyroidism s/p left inferior parathyroid adenoma removal in 2016 \par Current meds: on Vit D3 1000 units daily and calcium 1200 mg daily\par current sx: denies back/hip pains. denies falls/fracture\par Dairy intake - no calcium supplements, yogurt daily\par Exercise - decreased exercise w gyn closed, walking around block\par \par PreDM - exercise as above, nonadherent during pandemic\par

## 2020-07-10 NOTE — REVIEW OF SYSTEMS
[Recent Weight Loss (___ Lbs)] : recent weight loss: [unfilled] lbs [Headaches] : headaches [As Noted in HPI] : as noted in HPI [Fatigue] : no fatigue [Chest Pain] : no chest pain [Palpitations] : no palpitations [Shortness Of Breath] : no shortness of breath [Nausea] : no nausea [SOB on Exertion] : no shortness of breath on exertion [Polyuria] : no polyuria [Vomiting] : no vomiting [Abdominal Pain] : no abdominal pain [Dizziness] : no dizziness [Nocturia] : no nocturia [Anxiety] : no anxiety [Depression] : no depression [Heat Intolerance] : no heat intolerance [Cold Intolerance] : no cold intolerance

## 2020-07-10 NOTE — PHYSICAL EXAM
[Alert] : alert [Healthy Appearance] : healthy appearance [Well Nourished] : well nourished [No Acute Distress] : no acute distress [Normal Sclera/Conjunctiva] : normal sclera/conjunctiva [EOMI] : extra ocular movement intact [No LAD] : no lymphadenopathy [Thyroid Not Enlarged] : the thyroid was not enlarged [No Neck Mass] : no neck mass was observed [No Respiratory Distress] : no respiratory distress [No Accessory Muscle Use] : no accessory muscle use [Clear to Auscultation] : lungs were clear to auscultation bilaterally [Normal S1, S2] : normal S1 and S2 [Normal Rate] : heart rate was normal [No Edema] : no peripheral edema [Normal Bowel Sounds] : normal bowel sounds [Not Tender] : non-tender [Normal Gait] : normal gait [Soft] : abdomen soft [No Rash] : no rash [Cranial Nerves Intact] : cranial nerves 2-12 were intact [Normal Reflexes] : deep tendon reflexes were 2+ and symmetric [Oriented x3] : oriented to person, place, and time [Normal Affect] : the affect was normal [Normal Insight/Judgement] : insight and judgment were intact [Normal Mood] : the mood was normal

## 2020-07-10 NOTE — ASSESSMENT
[FreeTextEntry1] : 1. Hypothyroid - TSH  okay\par - cont Synthroid 88 mcg - 1 tab Mon-Sat, no tab on Sun\par - repeat TFTs 1 week before next visit\par \par 2. small Left thyroid nodule - stable\par -  repeat sono 11/2020\par \par 3. PreDM - worsenign A1c\par - cont to monitor\par - restart diet and exercise\par \par 4. Osteopenia - vit D and calcium okay, asymptomatic. \par - DXA 6/2022\par - encouraged weight bearing exercises\par - cont Vit D and calcium, increase calcium containing foods (not ice cream due to fasting hyperglycemia)\par \par 5. hoarseness - ENT eval advised

## 2020-10-14 LAB — HBA1C MFR BLD HPLC: 5.9

## 2020-10-15 ENCOUNTER — APPOINTMENT (OUTPATIENT)
Dept: ENDOCRINOLOGY | Facility: CLINIC | Age: 65
End: 2020-10-15
Payer: COMMERCIAL

## 2020-10-15 ENCOUNTER — APPOINTMENT (OUTPATIENT)
Dept: FAMILY MEDICINE | Facility: CLINIC | Age: 65
End: 2020-10-15
Payer: COMMERCIAL

## 2020-10-15 VITALS
HEART RATE: 81 BPM | HEIGHT: 64 IN | WEIGHT: 175 LBS | OXYGEN SATURATION: 99 % | DIASTOLIC BLOOD PRESSURE: 90 MMHG | BODY MASS INDEX: 29.88 KG/M2 | SYSTOLIC BLOOD PRESSURE: 136 MMHG

## 2020-10-15 DIAGNOSIS — Z23 ENCOUNTER FOR IMMUNIZATION: ICD-10-CM

## 2020-10-15 PROCEDURE — 99214 OFFICE O/P EST MOD 30 MIN: CPT

## 2020-10-15 PROCEDURE — G0008: CPT

## 2020-10-15 PROCEDURE — 90686 IIV4 VACC NO PRSV 0.5 ML IM: CPT

## 2020-10-15 NOTE — HISTORY OF PRESENT ILLNESS
[FreeTextEntry1] : Interval Hx: no issues, no changes\par \par Hypothyroid Dx 2010 on routine blood test, on Synthroid (AKBAR) 88 mcg 1 tab 6 days/week - adherent with dosing and administration\par Left thyroid nodule DX 2016 on sonogram\par Current sx: denies anterior neck pain, dysphagia, no voice changes\par \par Osteopenia on DXA 2016 and 2018 - h/o primary hyperparathyroidism s/p left inferior parathyroid adenoma removal in 2016 \par Current meds: on Vit D3 1000 units daily and calcium 1200 mg daily\par current sx: denies back/hip pains. denies falls/fracture\par Dairy intake - sometimes yogurt/cheese. occasional milk\par Exercise - walking, squats, lunges\par \par PreDM - exercise as above, trying to watch diet\par

## 2020-10-15 NOTE — ASSESSMENT
[FreeTextEntry1] : 1. Hypothyroid - TSH elevated\par - increase Synthroid 88 mcg 1 tab daily\par - repeat TFTs 1 week before next visit\par \par 2. small Left thyroid nodule - stable\par -  repeat sono 11/2020\par \par 3. PreDM - stable\par - cont to monitor\par - cont diet and exercise\par \par 4. Osteopenia - vit D and calcium okay, asymptomatic. \par - DXA 6/2022\par - encouraged weight bearing exercises\par - cont Vit D and calcium, increase calcium containing foods (not ice cream due to fasting hyperglycemia)\par

## 2020-10-15 NOTE — PHYSICAL EXAM
[Alert] : alert [Healthy Appearance] : healthy appearance [Well Nourished] : well nourished [Normal Sclera/Conjunctiva] : normal sclera/conjunctiva [No Acute Distress] : no acute distress [No Neck Mass] : no neck mass was observed [EOMI] : extra ocular movement intact [No Respiratory Distress] : no respiratory distress [Thyroid Not Enlarged] : the thyroid was not enlarged [No LAD] : no lymphadenopathy [No Accessory Muscle Use] : no accessory muscle use [Clear to Auscultation] : lungs were clear to auscultation bilaterally [Normal S1, S2] : normal S1 and S2 [Normal Rate] : heart rate was normal [Normal Bowel Sounds] : normal bowel sounds [No Edema] : no peripheral edema [Not Tender] : non-tender [Soft] : abdomen soft [Normal Gait] : normal gait [No Rash] : no rash [Normal Reflexes] : deep tendon reflexes were 2+ and symmetric [Cranial Nerves Intact] : cranial nerves 2-12 were intact [Normal Affect] : the affect was normal [Oriented x3] : oriented to person, place, and time [Normal Insight/Judgement] : insight and judgment were intact [Normal Mood] : the mood was normal

## 2020-10-15 NOTE — REVIEW OF SYSTEMS
[Recent Weight Loss (___ Lbs)] : recent weight loss: [unfilled] lbs [As Noted in HPI] : as noted in HPI [Headaches] : headaches [Fatigue] : no fatigue [Chest Pain] : no chest pain [Palpitations] : no palpitations [Shortness Of Breath] : no shortness of breath [SOB on Exertion] : no shortness of breath on exertion [Abdominal Pain] : no abdominal pain [Nausea] : no nausea [Vomiting] : no vomiting [Polyuria] : no polyuria [Nocturia] : no nocturia [Depression] : no depression [Anxiety] : no anxiety [Dizziness] : no dizziness [Heat Intolerance] : no heat intolerance [Cold Intolerance] : no cold intolerance

## 2020-10-26 ENCOUNTER — APPOINTMENT (OUTPATIENT)
Dept: ULTRASOUND IMAGING | Facility: CLINIC | Age: 65
End: 2020-10-26
Payer: COMMERCIAL

## 2020-10-26 ENCOUNTER — OUTPATIENT (OUTPATIENT)
Dept: OUTPATIENT SERVICES | Facility: HOSPITAL | Age: 65
LOS: 1 days | End: 2020-10-26
Payer: COMMERCIAL

## 2020-10-26 DIAGNOSIS — Z90.09 ACQUIRED ABSENCE OF OTHER PART OF HEAD AND NECK: Chronic | ICD-10-CM

## 2020-10-26 DIAGNOSIS — Z90.49 ACQUIRED ABSENCE OF OTHER SPECIFIED PARTS OF DIGESTIVE TRACT: Chronic | ICD-10-CM

## 2020-10-26 DIAGNOSIS — Z00.8 ENCOUNTER FOR OTHER GENERAL EXAMINATION: ICD-10-CM

## 2020-10-26 DIAGNOSIS — Z98.890 OTHER SPECIFIED POSTPROCEDURAL STATES: Chronic | ICD-10-CM

## 2020-10-26 DIAGNOSIS — N13.30 UNSPECIFIED HYDRONEPHROSIS: ICD-10-CM

## 2020-10-26 PROCEDURE — 76770 US EXAM ABDO BACK WALL COMP: CPT | Mod: 26

## 2020-10-26 PROCEDURE — 76770 US EXAM ABDO BACK WALL COMP: CPT

## 2020-11-04 ENCOUNTER — OUTPATIENT (OUTPATIENT)
Dept: OUTPATIENT SERVICES | Facility: HOSPITAL | Age: 65
LOS: 1 days | End: 2020-11-04
Payer: COMMERCIAL

## 2020-11-04 ENCOUNTER — APPOINTMENT (OUTPATIENT)
Dept: ULTRASOUND IMAGING | Facility: CLINIC | Age: 65
End: 2020-11-04
Payer: COMMERCIAL

## 2020-11-04 DIAGNOSIS — Z98.890 OTHER SPECIFIED POSTPROCEDURAL STATES: Chronic | ICD-10-CM

## 2020-11-04 DIAGNOSIS — Z90.49 ACQUIRED ABSENCE OF OTHER SPECIFIED PARTS OF DIGESTIVE TRACT: Chronic | ICD-10-CM

## 2020-11-04 DIAGNOSIS — E04.1 NONTOXIC SINGLE THYROID NODULE: ICD-10-CM

## 2020-11-04 DIAGNOSIS — Z00.8 ENCOUNTER FOR OTHER GENERAL EXAMINATION: ICD-10-CM

## 2020-11-04 DIAGNOSIS — Z90.09 ACQUIRED ABSENCE OF OTHER PART OF HEAD AND NECK: Chronic | ICD-10-CM

## 2020-11-04 PROCEDURE — 76536 US EXAM OF HEAD AND NECK: CPT

## 2020-11-04 PROCEDURE — 76536 US EXAM OF HEAD AND NECK: CPT | Mod: 26

## 2020-11-06 ENCOUNTER — TRANSCRIPTION ENCOUNTER (OUTPATIENT)
Age: 65
End: 2020-11-06

## 2020-11-16 ENCOUNTER — APPOINTMENT (OUTPATIENT)
Dept: UROLOGY | Facility: CLINIC | Age: 65
End: 2020-11-16
Payer: COMMERCIAL

## 2020-11-16 VITALS — BODY MASS INDEX: 29.88 KG/M2 | TEMPERATURE: 97.3 F | WEIGHT: 175 LBS | RESPIRATION RATE: 14 BRPM | HEIGHT: 64 IN

## 2020-11-16 PROCEDURE — 99213 OFFICE O/P EST LOW 20 MIN: CPT

## 2020-11-16 RX ORDER — AZITHROMYCIN 250 MG/1
250 TABLET, FILM COATED ORAL
Qty: 6 | Refills: 0 | Status: COMPLETED | COMMUNITY
Start: 2020-09-01

## 2020-11-16 RX ORDER — IBUPROFEN 600 MG/1
600 TABLET, FILM COATED ORAL
Qty: 28 | Refills: 0 | Status: COMPLETED | COMMUNITY
Start: 2020-09-01

## 2020-11-16 RX ORDER — CHLORHEXIDINE GLUCONATE, 0.12% ORAL RINSE 1.2 MG/ML
0.12 SOLUTION DENTAL
Qty: 473 | Refills: 0 | Status: COMPLETED | COMMUNITY
Start: 2020-09-01

## 2020-11-16 NOTE — HISTORY OF PRESENT ILLNESS
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 This began weeks ago, but has since resolved. \par It was moderate in severity. Nothing makes the symptoms better, nothing makes sx worse though patient thinks it may have been associated with a Protein Shake diet she was doing at the time. It is associated with nothing. Recent renal US showed increased LEFT hydronephrosis. UA (6/2019) showed 6 RBCs/hpf. She had a ureteroscopy several years ago for obstruction of her duplicate ureter.  \par No hematuria, no dysuria, no frequency, no urgency, no hesitancy, no straining. No incontinence. \par No fevers, no chills, no nausea, no vomiting. Cr 0.88 (9/2019).No family history contributory to LEFT hydronephrosis. \par \par 11/08/2019: Patient presents for follow up. She obtained CTU and is here to discuss results. She denies any new  symptoms and other medical  issues remain unchanged from above. No hematuria, no dysuria, no frequency, no urgency, no hesitancy, no straining. No incontinence. No fevers, no chills, no nausea, no vomiting, no flank pain.  CTU (11/1/19): LEFT duplicated renal collecting system, LEFT hydronephrosis and proximal hydroureter, with abrupt transition from dilated to collapsed in proximal ureter. \par \par 05/22/2020: Patient presents for follow up. She reports occasional LEFT flank pain, which is positional and mild. No other  symptoms and other medical  issues remain unchanged from above. No hematuria, no dysuria, no frequency, no urgency, no hesitancy, no straining. No incontinence. No fevers, no chills, no nausea, no vomiting. RBUS (3/2020) showed decreased LEFT hydronephrosis. Cr (2/2020) 0.99 \par \par 11/16/2020: Patient presents for follow up. She reports  symptoms and other medical  issues remain unchanged from above. No hematuria, no dysuria, no frequency, no urgency, no hesitancy, no straining. No incontinence. No fevers, no chills, no nausea, no vomiting, no flank pain. Cr (10/2020): 0.98. RBUS (10/2020) with mild hydro, unchanged from (3/2020)

## 2020-11-16 NOTE — ASSESSMENT
[FreeTextEntry1] : 64 yo F with LEFT hydronephrosis with duplicated system and history of ureteral obstruction, s/p endopyelotomy of lower pole moeity. Doing well. Hydro midl and stable. Cr stable. Recommend repeat RBUS and Cr in 6 months. She will RTO or Call sooner PRN.

## 2021-01-15 ENCOUNTER — APPOINTMENT (OUTPATIENT)
Dept: ENDOCRINOLOGY | Facility: CLINIC | Age: 66
End: 2021-01-15
Payer: COMMERCIAL

## 2021-01-15 VITALS
BODY MASS INDEX: 29.88 KG/M2 | OXYGEN SATURATION: 98 % | WEIGHT: 175 LBS | TEMPERATURE: 98 F | SYSTOLIC BLOOD PRESSURE: 140 MMHG | HEIGHT: 64 IN | HEART RATE: 103 BPM | DIASTOLIC BLOOD PRESSURE: 86 MMHG

## 2021-01-15 DIAGNOSIS — R22.42 LOCALIZED SWELLING, MASS AND LUMP, LEFT LOWER LIMB: ICD-10-CM

## 2021-01-15 PROCEDURE — 99072 ADDL SUPL MATRL&STAF TM PHE: CPT

## 2021-01-15 PROCEDURE — 99214 OFFICE O/P EST MOD 30 MIN: CPT

## 2021-01-15 NOTE — REVIEW OF SYSTEMS
[As Noted in HPI] : as noted in HPI [Fatigue] : no fatigue [Chest Pain] : no chest pain [Palpitations] : no palpitations [Shortness Of Breath] : no shortness of breath [SOB on Exertion] : no shortness of breath on exertion [Nausea] : no nausea [Abdominal Pain] : no abdominal pain [Vomiting] : no vomiting [Polyuria] : no polyuria [Nocturia] : no nocturia [Dizziness] : no dizziness [Depression] : no depression [Anxiety] : no anxiety [Cold Intolerance] : no cold intolerance [Heat Intolerance] : no heat intolerance

## 2021-01-15 NOTE — HISTORY OF PRESENT ILLNESS
[FreeTextEntry1] : Interval Hx: no issues, no changes, DARCY and BAUTISTA w COVID. 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 did not have labs done\par \par Hypothyroid Dx 2010 on routine blood test, on Synthroid (AKBAR) 88 mcg 1 tab daily - adherent with dosing and administration\par Left thyroid nodule DX 2016 on sonogram\par Current sx: denies anterior neck pain, dysphagia, no voice changes\par -------------------------------------------------------\par Osteopenia on DXA 2016 and 2018 - h/o primary hyperparathyroidism s/p left inferior parathyroid adenoma removal in 2016 \par Current meds: on Vit D3 1000 units daily and calcium 1200 mg daily\par current sx: denies back/hip pains. denies falls/fracture\par Dairy intake - sometimes yogurt/cheese. occasional milk\par Exercise - walking, squats, lunges\par -----------------------------------------------------------\par PreDM - exercise as above, trying to watch diet\par

## 2021-01-15 NOTE — ASSESSMENT
[FreeTextEntry1] : 1. Hypothyroid - no recent labs, euthyroid clinically\par - cont Synthroid 88 mcg 1 tab daily\par - repeat TFTs this week and  1 week before next visit\par \par 2. small Left thyroid nodule - stable\par -  repeat sono Fal 2021 to monitor size and appearance of nodule, consider FNA biopsy if nodules meet criteria\par \par 3. PreDM - stable\par - cont to monitor w labs\par - cont diet and exercise\par \par 4. Osteopenia - vit D and calcium okay, asymptomatic. \par - DXA 6/2022\par - encouraged weight bearing exercises\par - cont Vit D and calcium\par - monitor calcium and vit D level\par \par 5. abnormal thigh lump - advised PCP eval\par

## 2021-01-15 NOTE — DATA REVIEWED
[FreeTextEntry1] : 6/24/16 parathyroid imaging - Left lower pole parathyroid adenoma\par 9/21/16 Path Left inferios parathyroid - hypercellular, enlarged\par =================================================\par DXA 6/3/16 tscores\par L1-L4 -0.4\par LFN -1.4\par RFN -1.5\par L forearm -1.2\par \par DXA 6/2018 Tscores\par L1-L4 -0.3\par Left FN -1.6\par Right FN -1.4\par \par DXA 6/8/20 (done at different facility) Tscores\par L1-L4 -0.6\par Hip -1.1\par FRAX: MOF 7.5%, Hp Fx 0.5%\par ====================================================\par thyroid sonogram 6/3/16\par diffusely heterogeneous thyroid\par Left M-L pole hyperechoic nodule 6x5x5 mm\par Inferior to Left thyroid 1.6x.8x.8 cm - lymph node vs parathyroid nodule\par \par Thyroid songram 11/14/16 Left MP nodule 7x5x6 mm -stable\par \par Thyroid sonogram 12/2017 - Left thyroid nodule 6x6x7 mm\par \par Thyroid sono 12/2018 diffusely heterogenous gland, 5 mm area not nodule but heterogeneou area\par \par EXAM: US THYROID PARATHYROID \par PROCEDURE DATE: 11/22/2019 \par  INTERPRETATION: CLINICAL INFORMATION: Hypothyroidism. Left nodule. On Synthroid. \par COMPARISON: 12/14/2018 \par TECHNIQUE: Sonography of the thyroid. \par FINDINGS: \par Right Lobe: 4.7 x 2.1 x 1.6 cm.. The parenchyma is diffusely heterogeneous. No discrete nodule is \par seen. \par Left Lobe: 4.4 x 1.6 x 1.7 cm. 5 mm hypoechoic area in the midpole of the left thyroid lobe, \par unchanged.. Otherwise, the parenchyma is diffusely heterogeneous. \par Isthmus: 6 mm. \par Cervical Lymph Nodes: No enlarged or abnormal morphology cervical nodes. \par IMPRESSION: \par Diffusely heterogeneous thyroid gland consistent with underlying thyroiditis. Stable small \par hypoechoic area in the midpole of the left thyroid lobe.\par \par  EXAM:  US THYROID ONLY\par \par \par PROCEDURE DATE:  11/04/2020\par \par \par \par INTERPRETATION:  CLINICAL INFORMATION: Hypothyroidism. On Synthroid for 5 years.\par COMPARISON: November 22, 2019 and December 14, 2018.\par TECHNIQUE: Sonography of the thyroid.\par FINDINGS:\par Right Lobe: 4.5 x 2.1 x 1.6 cm. Diffusely heterogeneous in appearance with numerous small hypoechoic foci. A hyperechoic solid nodule in the lower pole (TI-RAD 3), measuring 0.4 x 0.3 x 0.5 cm appears similar, dating back to December 14, 2018.\par Left Lobe: 5.3 x 1.7 x 1.9 cm. Diffusely heterogeneous in appearance with numerous small hypoechoic foci. Similar appearance of hypoechoic region in the left midpole, measuring 0.5 x 0.5 x 0.4 cm.\par \par Isthmus: 6 mm.\par Cervical Lymph Nodes: No enlarged or abnormal morphology cervical nodes.\par IMPRESSION:\par Diffuse heterogeneity of the thyroid gland, unchanged since December 14, 2018.\par \par  \par \par ===============================================\par Labs 11/8/19\par Gluc 98\par Cr 0.97, GFR 63\par A1c 5.8%\par FT4 1.53, TSH 0.726\par Ca 9.4, Vit D 39.4\par \par Labs 2/28/20\par Gluc 107, A1c 5.6%\par Cr 0.99, GFR 61\par Ca 9.4, Vit D 43\par TSH 0.251, FT4 1.78\par \par Labs 7/2/20\par Gluc 104, A1c 5.8\par Cr 0.96, GFR 64\par TSH 2.570\par Vit D 35.6, Ca 9.2\par \par Labs 10/2/20\par Gluc 100, A1c 5.9\par Cr 0.98, GFR 62\par TSH 5.960, FT4 1.14\par Vit D 43, ca 9.0\par

## 2021-01-15 NOTE — PHYSICAL EXAM
[Alert] : alert [Well Nourished] : well nourished [Healthy Appearance] : healthy appearance [No Acute Distress] : no acute distress [Normal Sclera/Conjunctiva] : normal sclera/conjunctiva [EOMI] : extra ocular movement intact [No Neck Mass] : no neck mass was observed [No LAD] : no lymphadenopathy [Thyroid Not Enlarged] : the thyroid was not enlarged [No Respiratory Distress] : no respiratory distress [No Accessory Muscle Use] : no accessory muscle use [Clear to Auscultation] : lungs were clear to auscultation bilaterally [Normal S1, S2] : normal S1 and S2 [Normal Rate] : heart rate was normal [No Edema] : no peripheral edema [Normal Bowel Sounds] : normal bowel sounds [Not Tender] : non-tender [Soft] : abdomen soft [Normal Gait] : normal gait [No Rash] : no rash [Cranial Nerves Intact] : cranial nerves 2-12 were intact [Oriented x3] : oriented to person, place, and time [Normal Reflexes] : deep tendon reflexes were 2+ and symmetric [Normal Affect] : the affect was normal [Normal Insight/Judgement] : insight and judgment were intact [Normal Mood] : the mood was normal [de-identified] : Left lateral thight nodule - nontender, movable, non pulsatile

## 2021-01-27 ENCOUNTER — TRANSCRIPTION ENCOUNTER (OUTPATIENT)
Age: 66
End: 2021-01-27

## 2021-01-30 ENCOUNTER — TRANSCRIPTION ENCOUNTER (OUTPATIENT)
Age: 66
End: 2021-01-30

## 2021-02-02 ENCOUNTER — APPOINTMENT (OUTPATIENT)
Dept: FAMILY MEDICINE | Facility: CLINIC | Age: 66
End: 2021-02-02
Payer: COMMERCIAL

## 2021-02-02 DIAGNOSIS — Z71.89 OTHER SPECIFIED COUNSELING: ICD-10-CM

## 2021-02-02 PROCEDURE — 99214 OFFICE O/P EST MOD 30 MIN: CPT | Mod: 95

## 2021-02-02 NOTE — PHYSICAL EXAM
[No Acute Distress] : no acute distress [Well Nourished] : well nourished [Well Developed] : well developed [No Respiratory Distress] : no respiratory distress  [No Accessory Muscle Use] : no accessory muscle use [Normal] : normal gait, coordination grossly intact, no focal deficits and deep tendon reflexes were 2+ and symmetric

## 2021-02-09 NOTE — HISTORY OF PRESENT ILLNESS
[Home] : at home, [unfilled] , at the time of the visit. [Medical Office: ([facility])___] : at the medical office located in  [Verbal consent obtained from patient] : the patient, [unfilled] [FreeTextEntry8] : had covid\par first symtrpom 1/26/21\par has chills cough, headache\par lost taste and smell\par tired\par feeling better today\par taking vitamins and baby aspirin\par  also positive\par pulse ox between 97-99\par hr 98 after walking around 81 with rest None

## 2021-02-09 NOTE — ASSESSMENT
[FreeTextEntry1] : supporitve cre\par continue to monitor pulse ox\par call if any new or worsening symtpoms

## 2021-02-16 ENCOUNTER — TRANSCRIPTION ENCOUNTER (OUTPATIENT)
Age: 66
End: 2021-02-16

## 2021-02-17 ENCOUNTER — OUTPATIENT (OUTPATIENT)
Dept: OUTPATIENT SERVICES | Facility: HOSPITAL | Age: 66
LOS: 1 days | End: 2021-02-17
Payer: COMMERCIAL

## 2021-02-17 ENCOUNTER — APPOINTMENT (OUTPATIENT)
Dept: CT IMAGING | Facility: CLINIC | Age: 66
End: 2021-02-17
Payer: COMMERCIAL

## 2021-02-17 DIAGNOSIS — Z90.09 ACQUIRED ABSENCE OF OTHER PART OF HEAD AND NECK: Chronic | ICD-10-CM

## 2021-02-17 DIAGNOSIS — M26.601 RIGHT TEMPOROMANDIBULAR JOINT DISORDER, UNSPECIFIED: ICD-10-CM

## 2021-02-17 DIAGNOSIS — Z00.8 ENCOUNTER FOR OTHER GENERAL EXAMINATION: ICD-10-CM

## 2021-02-17 DIAGNOSIS — Z90.49 ACQUIRED ABSENCE OF OTHER SPECIFIED PARTS OF DIGESTIVE TRACT: Chronic | ICD-10-CM

## 2021-02-17 DIAGNOSIS — Z98.890 OTHER SPECIFIED POSTPROCEDURAL STATES: Chronic | ICD-10-CM

## 2021-02-17 PROCEDURE — 70486 CT MAXILLOFACIAL W/O DYE: CPT

## 2021-02-17 PROCEDURE — 70486 CT MAXILLOFACIAL W/O DYE: CPT | Mod: 26

## 2021-03-02 DIAGNOSIS — U07.1 COVID-19: ICD-10-CM

## 2021-03-03 ENCOUNTER — LABORATORY RESULT (OUTPATIENT)
Age: 66
End: 2021-03-03

## 2021-03-04 ENCOUNTER — NON-APPOINTMENT (OUTPATIENT)
Age: 66
End: 2021-03-04

## 2021-04-28 ENCOUNTER — TRANSCRIPTION ENCOUNTER (OUTPATIENT)
Age: 66
End: 2021-04-28

## 2021-04-28 ENCOUNTER — LABORATORY RESULT (OUTPATIENT)
Age: 66
End: 2021-04-28

## 2021-05-11 ENCOUNTER — APPOINTMENT (OUTPATIENT)
Dept: PHYSICAL MEDICINE AND REHAB | Facility: CLINIC | Age: 66
End: 2021-05-11
Payer: COMMERCIAL

## 2021-05-11 VITALS
HEIGHT: 64 IN | BODY MASS INDEX: 29.88 KG/M2 | WEIGHT: 175 LBS | SYSTOLIC BLOOD PRESSURE: 158 MMHG | DIASTOLIC BLOOD PRESSURE: 89 MMHG | TEMPERATURE: 98 F | OXYGEN SATURATION: 98 % | RESPIRATION RATE: 14 BRPM | HEART RATE: 81 BPM

## 2021-05-11 DIAGNOSIS — Z78.9 OTHER SPECIFIED HEALTH STATUS: ICD-10-CM

## 2021-05-11 DIAGNOSIS — S46.001A UNSPECIFIED INJURY OF MUSCLE(S) AND TENDON(S) OF THE ROTATOR CUFF OF RIGHT SHOULDER, INITIAL ENCOUNTER: ICD-10-CM

## 2021-05-11 PROCEDURE — 99204 OFFICE O/P NEW MOD 45 MIN: CPT

## 2021-05-11 PROCEDURE — 99072 ADDL SUPL MATRL&STAF TM PHE: CPT

## 2021-05-11 PROCEDURE — 73030 X-RAY EXAM OF SHOULDER: CPT | Mod: RT

## 2021-05-11 NOTE — ASSESSMENT
[FreeTextEntry1] : 65 y.o. F w/ right shoulder pain suspicious for SS tendon injury.  I spent most of today's visit (30 min) reviewing the patient's relevant imaging studies and discussing further non-operative management.  Discussed R/B/A to short course of meloxicam 15 mg; one tab po qd x 1-2 weeks as tolerated.  Rx P.T. for modalities, gentle ROM, stretching and strengthening exercises.  Will book for diagnostic MSK US evaluation right shoulder RC to r/o SS tendon tear.  Pt. is in agreement with plan.  All questions answered.  RTC 2-3 weeks for US.

## 2021-05-11 NOTE — PHYSICAL EXAM
[FreeTextEntry1] : NAD\par A&Ox3\par Mildly obese\par ROM RIGHT Shoulder: 120' ABD; 140 FF w/ pain\par Neer's: +\par Hawkin's: +\par Drop Arm: neg\par Scarf: deferred\par RC MMT: 4/5 R SS; 5-/5 R IS; 5/5 D\par Palpation: mild TTP distal SS tendon insertion\par C-spine \par Chavez's neg\par DTR's 1+ B/T/Br\par

## 2021-05-11 NOTE — DATA REVIEWED
[Plain X-Rays] : plain X-Rays [FreeTextEntry1] : X-rays Right Shoulder (2 views) obtained at today's office visit: IR/ER views reviewed.  No fx.  SubAc space well maintained.  GH and AC joint maintained w/o DJD.

## 2021-05-11 NOTE — HISTORY OF PRESENT ILLNESS
[FreeTextEntry1] : 65 y.o. F presents to office w/ c/o right shoulder pain.  Pt. states that she was reaching for something in cabinet beginning of April and heard "crunch" in right shoulder.  Then awoke that night w/ severe pain.  Did not see MD.  Pain in front of shoulder which can radiate to elbow.  h/o neck pain.  c/o some N/T in 3rd/4th digits right hand which she relates to injury.  No xray or MRI.  Was taking Aleve prn.  No P.T. or injections.

## 2021-05-18 ENCOUNTER — RX RENEWAL (OUTPATIENT)
Age: 66
End: 2021-05-18

## 2021-05-24 ENCOUNTER — APPOINTMENT (OUTPATIENT)
Dept: UROLOGY | Facility: CLINIC | Age: 66
End: 2021-05-24
Payer: COMMERCIAL

## 2021-05-24 VITALS — TEMPERATURE: 96.7 F

## 2021-05-24 PROCEDURE — 99072 ADDL SUPL MATRL&STAF TM PHE: CPT

## 2021-05-24 PROCEDURE — 99213 OFFICE O/P EST LOW 20 MIN: CPT

## 2021-05-24 NOTE — ASSESSMENT
[FreeTextEntry1] : 64 yo F with LEFT hydronephrosis with duplicated system and history of ureteral obstruction, s/p endopyelotomy of lower pole moeity. Doing well. Will obtain RBUS 10/2021, 1 year following last imaging. Pt will return sooner PRN.

## 2021-05-24 NOTE — HISTORY OF PRESENT ILLNESS
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 This began weeks ago, but has since resolved. \par It was moderate in severity. Nothing makes the symptoms better, nothing makes sx worse though patient thinks it may have been associated with a Protein Shake diet she was doing at the time. It is associated with nothing. Recent renal US showed increased LEFT hydronephrosis. UA (6/2019) showed 6 RBCs/hpf. She had a ureteroscopy several years ago for obstruction of her duplicate ureter.  \par No hematuria, no dysuria, no frequency, no urgency, no hesitancy, no straining. No incontinence. \par No fevers, no chills, no nausea, no vomiting. Cr 0.88 (9/2019).No family history contributory to LEFT hydronephrosis. \par \par 11/08/2019: Patient presents for follow up. She obtained CTU and is here to discuss results. She denies any new  symptoms and other medical  issues remain unchanged from above. No hematuria, no dysuria, no frequency, no urgency, no hesitancy, no straining. No incontinence. No fevers, no chills, no nausea, no vomiting, no flank pain.  CTU (11/1/19): LEFT duplicated renal collecting system, LEFT hydronephrosis and proximal hydroureter, with abrupt transition from dilated to collapsed in proximal ureter. \par \par 05/22/2020: Patient presents for follow up. She reports occasional LEFT flank pain, which is positional and mild. No other  symptoms and other medical  issues remain unchanged from above. No hematuria, no dysuria, no frequency, no urgency, no hesitancy, no straining. No incontinence. No fevers, no chills, no nausea, no vomiting. RBUS (3/2020) showed decreased LEFT hydronephrosis. Cr (2/2020) 0.99 \par \par 11/16/2020: Patient presents for follow up. She reports  symptoms and other medical  issues remain unchanged from above. No hematuria, no dysuria, no frequency, no urgency, no hesitancy, no straining. No incontinence. No fevers, no chills, no nausea, no vomiting, no flank pain. Cr (10/2020): 0.98. RBUS (10/2020) with mild hydro, unchanged from (3/2020)\par \par 05/24/2021: Patient presents for follow up. She denies any new  symptoms. No hematuria, no dysuria, no frequency, no urgency, no hesitancy, no straining. No incontinence. No fevers, no chills, no nausea, no vomiting, no flank pain. No new imaging. Cr 0.95 (4/2021)

## 2021-05-25 ENCOUNTER — RX RENEWAL (OUTPATIENT)
Age: 66
End: 2021-05-25

## 2021-06-08 NOTE — ED ADULT TRIAGE NOTE - RESPIRATORY RATE (BREATHS/MIN)
[FreeTextEntry1] : She is status post TAVR and a PPM.and knee replacement\par she will be followed in the office for her pacemaker and I will see her again in 6 months.\par I stopped the plavix and  the Lisinopril and started Losarten 25mg\par Reduced the lasix to 40mg alt. with 80mg.\par She is on coumadin\par Repeat echo before next visit 20

## 2021-07-06 ENCOUNTER — APPOINTMENT (OUTPATIENT)
Dept: PHYSICAL MEDICINE AND REHAB | Facility: CLINIC | Age: 66
End: 2021-07-06
Payer: COMMERCIAL

## 2021-07-06 VITALS
BODY MASS INDEX: 29.88 KG/M2 | HEIGHT: 64 IN | SYSTOLIC BLOOD PRESSURE: 147 MMHG | TEMPERATURE: 96 F | RESPIRATION RATE: 14 BRPM | WEIGHT: 175 LBS | HEART RATE: 80 BPM | DIASTOLIC BLOOD PRESSURE: 81 MMHG

## 2021-07-06 PROCEDURE — 99214 OFFICE O/P EST MOD 30 MIN: CPT

## 2021-07-06 PROCEDURE — 99072 ADDL SUPL MATRL&STAF TM PHE: CPT

## 2021-07-06 NOTE — HISTORY OF PRESENT ILLNESS
[FreeTextEntry1] : 65 y.o. F w/ right shoulder pain suspicious for SS tendon injury returns to office for f/u.  Pt. states that she is feeling "great".  Pt. finished her course of P.T. and is compliant w/ her HEP.  No longer taking meloxicam.  Able to perform overhead ADL w/o discomfort.  No tingling in fingers.

## 2021-07-06 NOTE — ASSESSMENT
[FreeTextEntry1] : 65 y.o. F w/ right shoulder largely resolved.  I spent most of today's visit (20 min) reviewing the patient's HEP and medication management.  Advised caution w/ chronic use of PO NSAIDs 2' GI/cardiac/renal toxicities.  No need for MSK US evaluation right shoulder RC to r/o SS tendon tear now.  Pt. is in agreement with plan.  All questions answered.  RTC prn.

## 2021-07-06 NOTE — PHYSICAL EXAM
[FreeTextEntry1] : NAD\par A&Ox3\par Mildly obese\par ROM RIGHT Shoulder: 160' ABD; 160 FF w/o pain\par Neer's: neg\par Hawkin's: neg\par Drop Arm: neg\par Scarf: deferred\par RC MMT: 5/5 R SS; 5/5 R IS; 5/5 D (all improved)\par Palpation: mild TTP distal SS tendon insertion\par C-spine \par Chavez's neg\par DTR's 1+ B/T/Br\par

## 2021-07-13 LAB
25(OH)D3 SERPL-MCNC: 52.3 NG/ML
ANION GAP SERPL CALC-SCNC: 10 MMOL/L
BUN SERPL-MCNC: 19 MG/DL
CALCIUM SERPL-MCNC: 9.5 MG/DL
CHLORIDE SERPL-SCNC: 103 MMOL/L
CO2 SERPL-SCNC: 27 MMOL/L
CREAT SERPL-MCNC: 1.04 MG/DL
ESTIMATED AVERAGE GLUCOSE: 117 MG/DL
GLUCOSE SERPL-MCNC: 107 MG/DL
HBA1C MFR BLD HPLC: 5.7 %
POTASSIUM SERPL-SCNC: 5.1 MMOL/L
SODIUM SERPL-SCNC: 140 MMOL/L
T4 FREE SERPL-MCNC: 1.2 NG/DL
TSH SERPL-ACNC: 2.27 UIU/ML

## 2021-07-15 ENCOUNTER — APPOINTMENT (OUTPATIENT)
Dept: ENDOCRINOLOGY | Facility: CLINIC | Age: 66
End: 2021-07-15
Payer: COMMERCIAL

## 2021-07-15 VITALS — DIASTOLIC BLOOD PRESSURE: 90 MMHG | SYSTOLIC BLOOD PRESSURE: 130 MMHG

## 2021-07-15 VITALS
HEIGHT: 64 IN | HEART RATE: 86 BPM | WEIGHT: 187 LBS | SYSTOLIC BLOOD PRESSURE: 140 MMHG | OXYGEN SATURATION: 96 % | TEMPERATURE: 98.1 F | DIASTOLIC BLOOD PRESSURE: 100 MMHG | BODY MASS INDEX: 31.92 KG/M2

## 2021-07-15 PROCEDURE — 99214 OFFICE O/P EST MOD 30 MIN: CPT

## 2021-07-15 PROCEDURE — 99072 ADDL SUPL MATRL&STAF TM PHE: CPT

## 2021-07-15 RX ORDER — MELOXICAM 15 MG/1
15 TABLET ORAL
Qty: 30 | Refills: 1 | Status: DISCONTINUED | COMMUNITY
Start: 2021-05-11 | End: 2021-07-15

## 2021-07-15 NOTE — ASSESSMENT
[FreeTextEntry1] : 1. Hypothyroid - normal TSH, clinically euthyroid\par - cont Synthroid 75 mcg 1 tab daily\par - repeat TFTs 1 week before next visit\par \par 2. small Left thyroid nodule - stable\par -  repeat sono Fall 2021 to monitor size and appearance of nodule, consider FNA biopsy if nodules meet criteria\par \par 3. PreDM - stable\par - cont to monitor w labs\par - cont diet and exercise\par \par 4. Osteopenia - vit D and calcium okay, asymptomatic. \par - DXA due 2022\par - encouraged weight bearing exercises\par - cont Vit D and calcium\par - monitor calcium and vit D level\par \par

## 2021-07-15 NOTE — PHYSICAL EXAM
[Alert] : alert [No Acute Distress] : no acute distress [Normal Sclera/Conjunctiva] : normal sclera/conjunctiva [EOMI] : extra ocular movement intact [No Neck Mass] : no neck mass was observed [No LAD] : no lymphadenopathy [Thyroid Not Enlarged] : the thyroid was not enlarged [No Respiratory Distress] : no respiratory distress [No Accessory Muscle Use] : no accessory muscle use [Clear to Auscultation] : lungs were clear to auscultation bilaterally [Normal S1, S2] : normal S1 and S2 [Normal Rate] : heart rate was normal [No Edema] : no peripheral edema [Normal Bowel Sounds] : normal bowel sounds [Not Tender] : non-tender [Soft] : abdomen soft [Normal Gait] : normal gait [No Rash] : no rash [Cranial Nerves Intact] : cranial nerves 2-12 were intact [Normal Reflexes] : deep tendon reflexes were 2+ and symmetric [Oriented x3] : oriented to person, place, and time [Normal Affect] : the affect was normal [Normal Insight/Judgement] : insight and judgment were intact [Normal Mood] : the mood was normal

## 2021-07-15 NOTE — HISTORY OF PRESENT ILLNESS
[FreeTextEntry1] : Interval Hx: no issues, no changes.  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 \par \par Hypothyroid Dx 2010 on routine blood test, on Synthroid (AKBAR) 75 mcg 1 tab daily - adherent with dosing and administration\par Left thyroid nodule DX 2016 on sonogram\par Current sx: denies anterior neck pain, dysphagia, no voice changes\par -------------------------------------------------------\par Osteopenia on DXA 2016 and 2018 - h/o primary hyperparathyroidism s/p left inferior parathyroid adenoma removal in 2016 \par Current meds: on Vit D3 1000 units daily and calcium 1200 mg daily\par current sx: denies back/hip pains. denies falls/fracture\par Dairy intake - sometimes yogurt/cheese. occasional milk\par Exercise - walking, decrease exercise due to shoulder injury\par -----------------------------------------------------------\par PreDM - exercise as above, trying to watch diet\par

## 2021-07-15 NOTE — DATA REVIEWED
[FreeTextEntry1] : 6/24/16 parathyroid imaging - Left lower pole parathyroid adenoma\par 9/21/16 Path Left inferios parathyroid - hypercellular, enlarged\par =================================================\par DXA 6/3/16 tscores\par L1-L4 -0.4\par LFN -1.4\par RFN -1.5\par L forearm -1.2\par \par DXA 6/2018 Tscores\par L1-L4 -0.3\par Left FN -1.6\par Right FN -1.4\par \par DXA 6/8/20 (done at different facility) Tscores\par L1-L4 -0.6\par Hip -1.1\par FRAX: MOF 7.5%, Hp Fx 0.5%\par ====================================================\par thyroid sonogram 6/3/16\par diffusely heterogeneous thyroid\par Left M-L pole hyperechoic nodule 6x5x5 mm\par Inferior to Left thyroid 1.6x.8x.8 cm - lymph node vs parathyroid nodule\par \par Thyroid songram 11/14/16 Left MP nodule 7x5x6 mm -stable\par \par Thyroid sonogram 12/2017 - Left thyroid nodule 6x6x7 mm\par \par Thyroid sono 12/2018 diffusely heterogenous gland, 5 mm area not nodule but heterogeneou area\par \par EXAM: US THYROID PARATHYROID \par PROCEDURE DATE: 11/22/2019 \par  INTERPRETATION: CLINICAL INFORMATION: Hypothyroidism. Left nodule. On Synthroid. \par COMPARISON: 12/14/2018 \par TECHNIQUE: Sonography of the thyroid. \par FINDINGS: \par Right Lobe: 4.7 x 2.1 x 1.6 cm.. The parenchyma is diffusely heterogeneous. No discrete nodule is \par seen. \par Left Lobe: 4.4 x 1.6 x 1.7 cm. 5 mm hypoechoic area in the midpole of the left thyroid lobe, \par unchanged.. Otherwise, the parenchyma is diffusely heterogeneous. \par Isthmus: 6 mm. \par Cervical Lymph Nodes: No enlarged or abnormal morphology cervical nodes. \par IMPRESSION: \par Diffusely heterogeneous thyroid gland consistent with underlying thyroiditis. Stable small \par hypoechoic area in the midpole of the left thyroid lobe.\par \par  EXAM:  US THYROID ONLY\par PROCEDURE DATE:  11/04/2020\par INTERPRETATION:  CLINICAL INFORMATION: Hypothyroidism. On Synthroid for 5 years.\par COMPARISON: November 22, 2019 and December 14, 2018.\par TECHNIQUE: Sonography of the thyroid.\par FINDINGS:\par Right Lobe: 4.5 x 2.1 x 1.6 cm. Diffusely heterogeneous in appearance with numerous small hypoechoic foci. A hyperechoic solid nodule in the lower pole (TI-RAD 3), measuring 0.4 x 0.3 x 0.5 cm appears similar, dating back to December 14, 2018.\par Left Lobe: 5.3 x 1.7 x 1.9 cm. Diffusely heterogeneous in appearance with numerous small hypoechoic foci. Similar appearance of hypoechoic region in the left midpole, measuring 0.5 x 0.5 x 0.4 cm.\par \par Isthmus: 6 mm.\par Cervical Lymph Nodes: No enlarged or abnormal morphology cervical nodes.\par IMPRESSION:\par Diffuse heterogeneity of the thyroid gland, unchanged since December 14, 2018.\par \par ===============================================\par Labs 11/8/19\par Gluc 98\par Cr 0.97, GFR 63\par A1c 5.8%\par FT4 1.53, TSH 0.726\par Ca 9.4, Vit D 39.4\par \par Labs 2/28/20\par Gluc 107, A1c 5.6%\par Cr 0.99, GFR 61\par Ca 9.4, Vit D 43\par TSH 0.251, FT4 1.78\par \par Labs 7/2/20\par Gluc 104, A1c 5.8\par Cr 0.96, GFR 64\par TSH 2.570\par Vit D 35.6, Ca 9.2\par \par Labs 10/2/20\par Gluc 100, A1c 5.9\par Cr 0.98, GFR 62\par TSH 5.960, FT4 1.14\par Vit D 43, ca 9.0\par \par "Labs 3/3/21 reviewed\par TSH low, needs less thyroid hormone - decrease Synthroid to 75 mcg daily and repeat TFTS 8 weeks\par Calcium and vitamin D normal\par stable PreDM w a1c 5.8"\par

## 2021-09-27 ENCOUNTER — APPOINTMENT (OUTPATIENT)
Dept: ULTRASOUND IMAGING | Facility: CLINIC | Age: 66
End: 2021-09-27
Payer: COMMERCIAL

## 2021-09-27 ENCOUNTER — RESULT REVIEW (OUTPATIENT)
Age: 66
End: 2021-09-27

## 2021-09-27 ENCOUNTER — OUTPATIENT (OUTPATIENT)
Dept: OUTPATIENT SERVICES | Facility: HOSPITAL | Age: 66
LOS: 1 days | End: 2021-09-27
Payer: COMMERCIAL

## 2021-09-27 DIAGNOSIS — Z00.8 ENCOUNTER FOR OTHER GENERAL EXAMINATION: ICD-10-CM

## 2021-09-27 DIAGNOSIS — Z98.890 OTHER SPECIFIED POSTPROCEDURAL STATES: Chronic | ICD-10-CM

## 2021-09-27 DIAGNOSIS — Z90.49 ACQUIRED ABSENCE OF OTHER SPECIFIED PARTS OF DIGESTIVE TRACT: Chronic | ICD-10-CM

## 2021-09-27 DIAGNOSIS — N13.30 UNSPECIFIED HYDRONEPHROSIS: ICD-10-CM

## 2021-09-27 DIAGNOSIS — Z90.09 ACQUIRED ABSENCE OF OTHER PART OF HEAD AND NECK: Chronic | ICD-10-CM

## 2021-09-27 PROCEDURE — 76775 US EXAM ABDO BACK WALL LIM: CPT

## 2021-09-27 PROCEDURE — 76775 US EXAM ABDO BACK WALL LIM: CPT | Mod: 26

## 2021-10-01 NOTE — DATA REVIEWED
[FreeTextEntry1] : 6/24/16 parathyroid imaging - Left lower pole parathyroid adenoma\par 9/21/16 Path Left inferios parathyroid - hypercellular, enlarged\par =================================================\par DXA 6/3/16 tscores\par L1-L4 -0.4\par LFN -1.4\par RFN -1.5\par L forearm -1.2\par \par DXA 6/2018 Tscores\par L1-L4 -0.3\par Left FN -1.6\par Right FN -1.4\par \par DXA 6/8/20 (done at different facility) Tscores\par L1-L4 -0.6\par Hip -1.1\par FRAX: MOF 7.5%, Hp Fx 0.5%\par ====================================================\par thyroid sonogram 6/3/16\par diffusely heterogeneous thyroid\par Left M-L pole hyperechoic nodule 6x5x5 mm\par Inferior to Left thyroid 1.6x.8x.8 cm - lymph node vs parathyroid nodule\par \par Thyroid songram 11/14/16 Left MP nodule 7x5x6 mm -stable\par \par Thyroid sonogram 12/2017 - Left thyroid nodule 6x6x7 mm\par \par Thyroid sono 12/2018 diffusely heterogenous gland, 5 mm area not nodule but heterogeneou area\par \par EXAM: US THYROID PARATHYROID \par PROCEDURE DATE: 11/22/2019 \par  INTERPRETATION: CLINICAL INFORMATION: Hypothyroidism. Left nodule. On Synthroid. \par COMPARISON: 12/14/2018 \par TECHNIQUE: Sonography of the thyroid. \par FINDINGS: \par Right Lobe: 4.7 x 2.1 x 1.6 cm.. The parenchyma is diffusely heterogeneous. No discrete nodule is \par seen. \par Left Lobe: 4.4 x 1.6 x 1.7 cm. 5 mm hypoechoic area in the midpole of the left thyroid lobe, \par unchanged.. Otherwise, the parenchyma is diffusely heterogeneous. \par Isthmus: 6 mm. \par Cervical Lymph Nodes: No enlarged or abnormal morphology cervical nodes. \par IMPRESSION: \par Diffusely heterogeneous thyroid gland consistent with underlying thyroiditis. Stable small \par hypoechoic area in the midpole of the left thyroid lobe.\par ===============================================\par Labs 11/8/19\par Gluc 98\par Cr 0.97, GFR 63\par A1c 5.8%\par FT4 1.53, TSH 0.726\par Ca 9.4, Vit D 39.4\par \par Labs 2/28/20\par Gluc 107, A1c 5.6%\par Cr 0.99, GFR 61\par Ca 9.4, Vit D 43\par TSH 0.251, FT4 1.78\par \par Labs 7/2/20\par Gluc 104, A1c 5.8\par Cr 0.96, GFR 64\par TSH 2.570\par Vit D 35.6, Ca 9.2\par \par Labs 10/2/20\par Gluc 100, A1c 5.9\par Cr 0.98, GFR 62\par TSH 5.960, FT4 1.14\par Vit D 43, ca 9.0\par  .

## 2021-10-11 ENCOUNTER — APPOINTMENT (OUTPATIENT)
Dept: UROLOGY | Facility: CLINIC | Age: 66
End: 2021-10-11
Payer: COMMERCIAL

## 2021-10-11 VITALS — TEMPERATURE: 98.1 F

## 2021-10-11 PROCEDURE — 99213 OFFICE O/P EST LOW 20 MIN: CPT

## 2021-10-11 NOTE — HISTORY OF PRESENT ILLNESS
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 This began weeks ago, but has since resolved. \par It was moderate in severity. Nothing makes the symptoms better, nothing makes sx worse though patient thinks it may have been associated with a Protein Shake diet she was doing at the time. It is associated with nothing. Recent renal US showed increased LEFT hydronephrosis. UA (6/2019) showed 6 RBCs/hpf. She had a ureteroscopy several years ago for obstruction of her duplicate ureter.  \par No hematuria, no dysuria, no frequency, no urgency, no hesitancy, no straining. No incontinence. \par No fevers, no chills, no nausea, no vomiting. Cr 0.88 (9/2019).No family history contributory to LEFT hydronephrosis. \par \par 11/08/2019: Patient presents for follow up. She obtained CTU and is here to discuss results. She denies any new  symptoms and other medical  issues remain unchanged from above. No hematuria, no dysuria, no frequency, no urgency, no hesitancy, no straining. No incontinence. No fevers, no chills, no nausea, no vomiting, no flank pain.  CTU (11/1/19): LEFT duplicated renal collecting system, LEFT hydronephrosis and proximal hydroureter, with abrupt transition from dilated to collapsed in proximal ureter. \par \par 05/22/2020: Patient presents for follow up. She reports occasional LEFT flank pain, which is positional and mild. No other  symptoms and other medical  issues remain unchanged from above. No hematuria, no dysuria, no frequency, no urgency, no hesitancy, no straining. No incontinence. No fevers, no chills, no nausea, no vomiting. RBUS (3/2020) showed decreased LEFT hydronephrosis. Cr (2/2020) 0.99 \par \par 11/16/2020: Patient presents for follow up. She reports  symptoms and other medical  issues remain unchanged from above. No hematuria, no dysuria, no frequency, no urgency, no hesitancy, no straining. No incontinence. No fevers, no chills, no nausea, no vomiting, no flank pain. Cr (10/2020): 0.98. RBUS (10/2020) with mild hydro, unchanged from (3/2020)\par \par 05/24/2021: Patient presents for follow up. She denies any new  symptoms. No hematuria, no dysuria, no frequency, no urgency, no hesitancy, no straining. No incontinence. No fevers, no chills, no nausea, no vomiting, no flank pain. No new imaging. Cr 0.95 (4/2021)\par \par 10/11/2021: Patient presents for follow up. She denies any new  symptoms. No hematuria, no dysuria, no frequency, no urgency, no hesitancy, no straining. No incontinence. No fevers, no chills, no nausea, no vomiting, no flank pain. RBUS shows stable mild LEFT hydro. Cr 1.04 (7/2021)

## 2021-10-11 NOTE — ASSESSMENT
[FreeTextEntry1] : 65 yo F with LEFT hydronephrosis with duplicated system and history of ureteral obstruction, s/p endopyelotomy of lower pole moeity. Doing well. RBUS showed mild hydro. Will obtain repeat RBUS in 1 year. Pt will return sooner PRN.

## 2021-11-05 ENCOUNTER — APPOINTMENT (OUTPATIENT)
Dept: ULTRASOUND IMAGING | Facility: CLINIC | Age: 66
End: 2021-11-05
Payer: COMMERCIAL

## 2021-11-05 ENCOUNTER — APPOINTMENT (OUTPATIENT)
Dept: ULTRASOUND IMAGING | Facility: CLINIC | Age: 66
End: 2021-11-05

## 2021-11-05 ENCOUNTER — OUTPATIENT (OUTPATIENT)
Dept: OUTPATIENT SERVICES | Facility: HOSPITAL | Age: 66
LOS: 1 days | End: 2021-11-05
Payer: COMMERCIAL

## 2021-11-05 DIAGNOSIS — E04.1 NONTOXIC SINGLE THYROID NODULE: ICD-10-CM

## 2021-11-05 DIAGNOSIS — Z98.890 OTHER SPECIFIED POSTPROCEDURAL STATES: Chronic | ICD-10-CM

## 2021-11-05 DIAGNOSIS — Z90.49 ACQUIRED ABSENCE OF OTHER SPECIFIED PARTS OF DIGESTIVE TRACT: Chronic | ICD-10-CM

## 2021-11-05 DIAGNOSIS — Z90.09 ACQUIRED ABSENCE OF OTHER PART OF HEAD AND NECK: Chronic | ICD-10-CM

## 2021-11-05 DIAGNOSIS — E03.9 HYPOTHYROIDISM, UNSPECIFIED: ICD-10-CM

## 2021-11-05 DIAGNOSIS — M85.80 OTHER SPECIFIED DISORDERS OF BONE DENSITY AND STRUCTURE, UNSPECIFIED SITE: ICD-10-CM

## 2021-11-05 DIAGNOSIS — R73.03 PREDIABETES: ICD-10-CM

## 2021-11-05 PROCEDURE — 76536 US EXAM OF HEAD AND NECK: CPT

## 2021-11-05 PROCEDURE — 76536 US EXAM OF HEAD AND NECK: CPT | Mod: 26

## 2021-11-12 ENCOUNTER — TRANSCRIPTION ENCOUNTER (OUTPATIENT)
Age: 66
End: 2021-11-12

## 2021-12-21 DIAGNOSIS — Z20.822 CONTACT WITH AND (SUSPECTED) EXPOSURE TO COVID-19: ICD-10-CM

## 2022-01-11 LAB
25(OH)D3 SERPL-MCNC: 43.1 NG/ML
ANION GAP SERPL CALC-SCNC: 11 MMOL/L
BUN SERPL-MCNC: 22 MG/DL
CALCIUM SERPL-MCNC: 9.4 MG/DL
CHLORIDE SERPL-SCNC: 103 MMOL/L
CO2 SERPL-SCNC: 26 MMOL/L
CREAT SERPL-MCNC: 1.1 MG/DL
ESTIMATED AVERAGE GLUCOSE: 126 MG/DL
GLUCOSE SERPL-MCNC: 102 MG/DL
HBA1C MFR BLD HPLC: 6 %
POTASSIUM SERPL-SCNC: 5 MMOL/L
SODIUM SERPL-SCNC: 140 MMOL/L
T4 FREE SERPL-MCNC: 1.2 NG/DL
TSH SERPL-ACNC: 1.81 UIU/ML

## 2022-01-20 ENCOUNTER — APPOINTMENT (OUTPATIENT)
Dept: ENDOCRINOLOGY | Facility: CLINIC | Age: 67
End: 2022-01-20
Payer: COMMERCIAL

## 2022-01-20 PROCEDURE — 99214 OFFICE O/P EST MOD 30 MIN: CPT | Mod: 95

## 2022-01-20 NOTE — ASSESSMENT
[FreeTextEntry1] : 1. Hypothyroid - normal TSH, clinically euthyroid\par - cont Synthroid 75 mcg 1 tab daily\par - repeat TFTs 1 week before next visit\par \par 2. small thyroid nodules - stable\par -  repeat sono Fall 2022 to monitor size and appearance of nodule\par \par 3. PreDM - worsening control due to holiday diet\par - cont to monitor w labs\par - cont diet and exercise\par \par 4. Osteopenia - vit D and calcium okay, asymptomatic. \par - DXA due summer 2022\par - encouraged weight bearing exercises\par - cont Vit D and calcium\par - monitor calcium and vit D level\par \par

## 2022-01-20 NOTE — HISTORY OF PRESENT ILLNESS
[FreeTextEntry1] : Interval Hx:recent sinus infx, s/p dental implant \par \par Hypothyroid Dx 2010 on routine blood test, on Synthroid (AKBAR) 75 mcg 1 tab daily - adherent with dosing and administration\par Left thyroid nodule DX 2016 on sonogram\par Current sx: denies anterior neck pain, dysphagia, no voice changes\par -------------------------------------------------------\par Osteopenia on DXA 2016 and 2018 - h/o primary hyperparathyroidism s/p left inferior parathyroid adenoma removal in 2016 \par Current meds: on Vit D3 1000 units daily and calcium 1200 mg daily\par current sx: denies back/hip pains. denies falls/fracture\par Dairy intake - sometimes yogurt/cheese. occasional milk\par Exercise - walking\par -----------------------------------------------------------\par PreDM - exercise as above, worsening diet over holidays\par

## 2022-01-20 NOTE — PHYSICAL EXAM
[Alert] : alert [EOMI] : extra ocular movement intact [No Respiratory Distress] : no respiratory distress [Oriented x3] : oriented to person, place, and time [Normal Affect] : the affect was normal [Normal Mood] : the mood was normal

## 2022-01-20 NOTE — DATA REVIEWED
[FreeTextEntry1] : 6/24/16 parathyroid imaging - Left lower pole parathyroid adenoma\par 9/21/16 Path Left inferios parathyroid - hypercellular, enlarged\par =================================================\par DXA 6/3/16 tscores\par L1-L4 -0.4\par LFN -1.4\par RFN -1.5\par L forearm -1.2\par \par DXA 6/2018 Tscores\par L1-L4 -0.3\par Left FN -1.6\par Right FN -1.4\par \par DXA 6/8/20 (done at different facility) Tscores\par L1-L4 -0.6\par Hip -1.1\par FRAX: MOF 7.5%, Hp Fx 0.5%\par ====================================================\par thyroid sonogram 6/3/16\par diffusely heterogeneous thyroid\par Left M-L pole hyperechoic nodule 6x5x5 mm\par Inferior to Left thyroid 1.6x.8x.8 cm - lymph node vs parathyroid nodule\par \par Thyroid songram 11/14/16 Left MP nodule 7x5x6 mm -stable\par \par Thyroid sonogram 12/2017 - Left thyroid nodule 6x6x7 mm\par \par Thyroid sono 12/2018 diffusely heterogenous gland, 5 mm area not nodule but heterogeneou area\par \par EXAM: US THYROID PARATHYROID \par PROCEDURE DATE: 11/22/2019 \par  INTERPRETATION: CLINICAL INFORMATION: Hypothyroidism. Left nodule. On Synthroid. \par COMPARISON: 12/14/2018 \par TECHNIQUE: Sonography of the thyroid. \par FINDINGS: \par Right Lobe: 4.7 x 2.1 x 1.6 cm.. The parenchyma is diffusely heterogeneous. No discrete nodule is \par seen. \par Left Lobe: 4.4 x 1.6 x 1.7 cm. 5 mm hypoechoic area in the midpole of the left thyroid lobe, \par unchanged.. Otherwise, the parenchyma is diffusely heterogeneous. \par Isthmus: 6 mm. \par Cervical Lymph Nodes: No enlarged or abnormal morphology cervical nodes. \par IMPRESSION: \par Diffusely heterogeneous thyroid gland consistent with underlying thyroiditis. Stable small \par hypoechoic area in the midpole of the left thyroid lobe.\par \par  EXAM:  US THYROID ONLY\par PROCEDURE DATE:  11/04/2020\par INTERPRETATION:  CLINICAL INFORMATION: Hypothyroidism. On Synthroid for 5 years.\par COMPARISON: November 22, 2019 and December 14, 2018.\par TECHNIQUE: Sonography of the thyroid.\par FINDINGS:\par Right Lobe: 4.5 x 2.1 x 1.6 cm. Diffusely heterogeneous in appearance with numerous small hypoechoic foci. A hyperechoic solid nodule in the lower pole (TI-RAD 3), measuring 0.4 x 0.3 x 0.5 cm appears similar, dating back to December 14, 2018.\par Left Lobe: 5.3 x 1.7 x 1.9 cm. Diffusely heterogeneous in appearance with numerous small hypoechoic foci. Similar appearance of hypoechoic region in the left midpole, measuring 0.5 x 0.5 x 0.4 cm.\par \par Isthmus: 6 mm.\par Cervical Lymph Nodes: No enlarged or abnormal morphology cervical nodes.\par IMPRESSION:\par Diffuse heterogeneity of the thyroid gland, unchanged since December 14, 2018.\par \par  EXAM:  US THYROID ONLY\par PROCEDURE DATE:  11/05/2021\par INTERPRETATION:  CLINICAL INFORMATION: Thyroid nodule\par COMPARISON: Ultrasound 12/14/2018\par TECHNIQUE: Sonography of the thyroid.\par FINDINGS:\par Right Lobe: 4.1 cm x  2.2 cm x 1.7 cm. 4 mm echogenic inferior nodule unchanged in retrospect since 12/14/2018\par Left Lobe: 5.5 cm x 1.5 cm x 1.6 cm. Echo poor mid nodule 7 x 4 x 4 mm unchanged from 12/14/2018\par Isthmus: 5 mm.\par Heterogeneous parenchyma consistent with thyroiditis.\par Cervical Lymph Nodes: No enlarged or abnormal morphology cervical nodes.\par IMPRESSION:\par Thyroiditis with subcentimeter nodules unchanged\par  \par \par ===============================================\par Labs 11/8/19\par Gluc 98\par Cr 0.97, GFR 63\par A1c 5.8%\par FT4 1.53, TSH 0.726\par Ca 9.4, Vit D 39.4\par \par Labs 2/28/20\par Gluc 107, A1c 5.6%\par Cr 0.99, GFR 61\par Ca 9.4, Vit D 43\par TSH 0.251, FT4 1.78\par \par Labs 7/2/20\par Gluc 104, A1c 5.8\par Cr 0.96, GFR 64\par TSH 2.570\par Vit D 35.6, Ca 9.2\par \par Labs 10/2/20\par Gluc 100, A1c 5.9\par Cr 0.98, GFR 62\par TSH 5.960, FT4 1.14\par Vit D 43, ca 9.0\par \par "Labs 3/3/21 reviewed\par TSH low, needs less thyroid hormone - decrease Synthroid to 75 mcg daily and repeat TFTS 8 weeks\par Calcium and vitamin D normal\par stable PreDM w a1c 5.8"\par

## 2022-01-20 NOTE — REVIEW OF SYSTEMS
[As Noted in HPI] : as noted in HPI [Palpitations] : no palpitations [Constipation] : no constipation [Diarrhea] : no diarrhea [Polydipsia] : no polydipsia

## 2022-01-20 NOTE — REASON FOR VISIT
[Home] : at home, [unfilled] , at the time of the visit. [Medical Office: ([facility])___] : at the medical office located in  [Verbal consent obtained from patient] : the patient, [unfilled] [Follow - Up] : a follow-up visit [Hypothyroidism] : hypothyroidism [Thyroid nodule/ MNG] : thyroid nodule/ MNG [Other___] : [unfilled]

## 2022-03-25 ENCOUNTER — APPOINTMENT (OUTPATIENT)
Dept: MRI IMAGING | Facility: CLINIC | Age: 67
End: 2022-03-25

## 2022-03-28 ENCOUNTER — OUTPATIENT (OUTPATIENT)
Dept: OUTPATIENT SERVICES | Facility: HOSPITAL | Age: 67
LOS: 1 days | End: 2022-03-28
Payer: COMMERCIAL

## 2022-03-28 ENCOUNTER — APPOINTMENT (OUTPATIENT)
Dept: MRI IMAGING | Facility: CLINIC | Age: 67
End: 2022-03-28
Payer: COMMERCIAL

## 2022-03-28 DIAGNOSIS — Z90.49 ACQUIRED ABSENCE OF OTHER SPECIFIED PARTS OF DIGESTIVE TRACT: Chronic | ICD-10-CM

## 2022-03-28 DIAGNOSIS — Z90.09 ACQUIRED ABSENCE OF OTHER PART OF HEAD AND NECK: Chronic | ICD-10-CM

## 2022-03-28 DIAGNOSIS — Z00.8 ENCOUNTER FOR OTHER GENERAL EXAMINATION: ICD-10-CM

## 2022-03-28 DIAGNOSIS — Z98.890 OTHER SPECIFIED POSTPROCEDURAL STATES: Chronic | ICD-10-CM

## 2022-03-28 PROCEDURE — 74183 MRI ABD W/O CNTR FLWD CNTR: CPT

## 2022-03-28 PROCEDURE — A9585: CPT

## 2022-03-28 PROCEDURE — 74183 MRI ABD W/O CNTR FLWD CNTR: CPT | Mod: 26

## 2022-04-19 ENCOUNTER — OUTPATIENT (OUTPATIENT)
Dept: OUTPATIENT SERVICES | Facility: HOSPITAL | Age: 67
LOS: 1 days | End: 2022-04-19
Payer: COMMERCIAL

## 2022-04-19 ENCOUNTER — APPOINTMENT (OUTPATIENT)
Dept: MRI IMAGING | Facility: CLINIC | Age: 67
End: 2022-04-19
Payer: COMMERCIAL

## 2022-04-19 DIAGNOSIS — Z90.09 ACQUIRED ABSENCE OF OTHER PART OF HEAD AND NECK: Chronic | ICD-10-CM

## 2022-04-19 DIAGNOSIS — Z00.8 ENCOUNTER FOR OTHER GENERAL EXAMINATION: ICD-10-CM

## 2022-04-19 DIAGNOSIS — Z98.890 OTHER SPECIFIED POSTPROCEDURAL STATES: Chronic | ICD-10-CM

## 2022-04-19 DIAGNOSIS — Z90.49 ACQUIRED ABSENCE OF OTHER SPECIFIED PARTS OF DIGESTIVE TRACT: Chronic | ICD-10-CM

## 2022-04-19 PROCEDURE — A9585: CPT

## 2022-04-19 PROCEDURE — 72197 MRI PELVIS W/O & W/DYE: CPT

## 2022-04-19 PROCEDURE — 72197 MRI PELVIS W/O & W/DYE: CPT | Mod: 26

## 2022-06-09 ENCOUNTER — APPOINTMENT (OUTPATIENT)
Dept: RADIOLOGY | Facility: CLINIC | Age: 67
End: 2022-06-09
Payer: COMMERCIAL

## 2022-06-09 ENCOUNTER — OUTPATIENT (OUTPATIENT)
Dept: OUTPATIENT SERVICES | Facility: HOSPITAL | Age: 67
LOS: 1 days | End: 2022-06-09
Payer: COMMERCIAL

## 2022-06-09 DIAGNOSIS — R73.03 PREDIABETES: ICD-10-CM

## 2022-06-09 DIAGNOSIS — E03.9 HYPOTHYROIDISM, UNSPECIFIED: ICD-10-CM

## 2022-06-09 DIAGNOSIS — Z00.8 ENCOUNTER FOR OTHER GENERAL EXAMINATION: ICD-10-CM

## 2022-06-09 DIAGNOSIS — E04.1 NONTOXIC SINGLE THYROID NODULE: ICD-10-CM

## 2022-06-09 DIAGNOSIS — Z98.890 OTHER SPECIFIED POSTPROCEDURAL STATES: Chronic | ICD-10-CM

## 2022-06-09 DIAGNOSIS — M85.80 OTHER SPECIFIED DISORDERS OF BONE DENSITY AND STRUCTURE, UNSPECIFIED SITE: ICD-10-CM

## 2022-06-09 DIAGNOSIS — Z90.09 ACQUIRED ABSENCE OF OTHER PART OF HEAD AND NECK: Chronic | ICD-10-CM

## 2022-06-09 DIAGNOSIS — Z90.49 ACQUIRED ABSENCE OF OTHER SPECIFIED PARTS OF DIGESTIVE TRACT: Chronic | ICD-10-CM

## 2022-06-09 PROCEDURE — 77085 DXA BONE DENSITY AXL VRT FX: CPT | Mod: 26

## 2022-06-09 PROCEDURE — 77085 DXA BONE DENSITY AXL VRT FX: CPT

## 2022-06-24 LAB
25(OH)D3 SERPL-MCNC: 48.1 NG/ML
ALBUMIN SERPL ELPH-MCNC: 4.4 G/DL
ALP BLD-CCNC: 99 U/L
ALT SERPL-CCNC: 17 U/L
ANION GAP SERPL CALC-SCNC: 12 MMOL/L
AST SERPL-CCNC: 18 U/L
BILIRUB SERPL-MCNC: 0.5 MG/DL
BUN SERPL-MCNC: 17 MG/DL
CALCIUM SERPL-MCNC: 9.9 MG/DL
CHLORIDE SERPL-SCNC: 104 MMOL/L
CO2 SERPL-SCNC: 26 MMOL/L
CREAT SERPL-MCNC: 1.02 MG/DL
EGFR: 61 ML/MIN/1.73M2
ESTIMATED AVERAGE GLUCOSE: 120 MG/DL
GLUCOSE SERPL-MCNC: 101 MG/DL
HBA1C MFR BLD HPLC: 5.8 %
POTASSIUM SERPL-SCNC: 4.8 MMOL/L
PROT SERPL-MCNC: 7.4 G/DL
SODIUM SERPL-SCNC: 142 MMOL/L
T4 FREE SERPL-MCNC: 1.3 NG/DL
TSH SERPL-ACNC: 1.45 UIU/ML

## 2022-07-01 ENCOUNTER — APPOINTMENT (OUTPATIENT)
Dept: ENDOCRINOLOGY | Facility: CLINIC | Age: 67
End: 2022-07-01

## 2022-07-01 VITALS
HEART RATE: 98 BPM | HEIGHT: 64 IN | BODY MASS INDEX: 32.44 KG/M2 | WEIGHT: 190 LBS | DIASTOLIC BLOOD PRESSURE: 80 MMHG | SYSTOLIC BLOOD PRESSURE: 130 MMHG

## 2022-07-01 PROCEDURE — 99214 OFFICE O/P EST MOD 30 MIN: CPT

## 2022-07-01 NOTE — DATA REVIEWED
[FreeTextEntry1] : 6/24/16 parathyroid imaging - Left lower pole parathyroid adenoma\par 9/21/16 Path Left inferios parathyroid - hypercellular, enlarged\par =================================================\par DXA 6/3/16 tscores\par L1-L4 -0.4\par LFN -1.4\par RFN -1.5\par L forearm -1.2\par \par DXA 6/2018 Tscores\par L1-L4 -0.3\par Left FN -1.6\par Right FN -1.4\par \par DXA 6/8/20 (done at different facility) Tscores\par L1-L4 -0.6\par Hip -1.1\par FRAX: MOF 7.5%, Hp Fx 0.5%\par \par PROCEDURE DATE:  06/09/2022\par INTERPRETATION:  CLINICAL INDICATION: History of osteopenia. Follow-up study. Screening for osteoporosis.\par Thank you for referring your patient for bone densitometry and vertebral fracture assessment on the  HOLOGIC machine.  The results are expressed as a T-score, or the standard deviation from the mean young normal value, which is the basis for the WHO diagnostic criteria for bone density.\par COMPARISON: Bone Density dated 6/8/2020.\par RESULTS:\par Spine: -0.7, normal; there has been a -1.4% change in the bone density of the lumbar spine as compared with the prior study.\par Femoral neck: -1.1, osteopenia.\par Total hip: -0.2, osteopenia; there has been a +2.7% change in the bone density of the total left hip as compared with the prior study.\par Single lateral view of the thoracolumbar spine (T10-L4)  demonstrates no evidence of vertebral compression deformity.\par IMPRESSION: Osteopenia.\par FRACTURE RISK: Using the FRAX 10 year fracture risk calculator the patient's ten-year risk of any fracture is 7.8% and the patient's risk of  hip fracture  is  0.6%. Additional risk factors used in the FRAX calculation: None.\par TREATMENT RECOMMENDATIONS:  Based on NOF treatment guidelines medical therapy is not recommended at this time.\par FOLLOW-UP: A repeat examination is recommended in 2 years.\par \par \par ====================================================\par thyroid sonogram 6/3/16\par diffusely heterogeneous thyroid\par Left M-L pole hyperechoic nodule 6x5x5 mm\par Inferior to Left thyroid 1.6x.8x.8 cm - lymph node vs parathyroid nodule\par \par Thyroid songram 11/14/16 Left MP nodule 7x5x6 mm -stable\par \par Thyroid sonogram 12/2017 - Left thyroid nodule 6x6x7 mm\par \par Thyroid sono 12/2018 diffusely heterogenous gland, 5 mm area not nodule but heterogeneou area\par \par EXAM: US THYROID PARATHYROID \par PROCEDURE DATE: 11/22/2019 \par  INTERPRETATION: CLINICAL INFORMATION: Hypothyroidism. Left nodule. On Synthroid. \par COMPARISON: 12/14/2018 \par TECHNIQUE: Sonography of the thyroid. \par FINDINGS: \par Right Lobe: 4.7 x 2.1 x 1.6 cm.. The parenchyma is diffusely heterogeneous. No discrete nodule is \par seen. \par Left Lobe: 4.4 x 1.6 x 1.7 cm. 5 mm hypoechoic area in the midpole of the left thyroid lobe, \par unchanged.. Otherwise, the parenchyma is diffusely heterogeneous. \par Isthmus: 6 mm. \par Cervical Lymph Nodes: No enlarged or abnormal morphology cervical nodes. \par IMPRESSION: \par Diffusely heterogeneous thyroid gland consistent with underlying thyroiditis. Stable small \par hypoechoic area in the midpole of the left thyroid lobe.\par \par  EXAM:  US THYROID ONLY\par PROCEDURE DATE:  11/04/2020\par INTERPRETATION:  CLINICAL INFORMATION: Hypothyroidism. On Synthroid for 5 years.\par COMPARISON: November 22, 2019 and December 14, 2018.\par TECHNIQUE: Sonography of the thyroid.\par FINDINGS:\par Right Lobe: 4.5 x 2.1 x 1.6 cm. Diffusely heterogeneous in appearance with numerous small hypoechoic foci. A hyperechoic solid nodule in the lower pole (TI-RAD 3), measuring 0.4 x 0.3 x 0.5 cm appears similar, dating back to December 14, 2018.\par Left Lobe: 5.3 x 1.7 x 1.9 cm. Diffusely heterogeneous in appearance with numerous small hypoechoic foci. Similar appearance of hypoechoic region in the left midpole, measuring 0.5 x 0.5 x 0.4 cm.\par \par Isthmus: 6 mm.\par Cervical Lymph Nodes: No enlarged or abnormal morphology cervical nodes.\par IMPRESSION:\par Diffuse heterogeneity of the thyroid gland, unchanged since December 14, 2018.\par \par  EXAM:  US THYROID ONLY\par PROCEDURE DATE:  11/05/2021\par INTERPRETATION:  CLINICAL INFORMATION: Thyroid nodule\par COMPARISON: Ultrasound 12/14/2018\par TECHNIQUE: Sonography of the thyroid.\par FINDINGS:\par Right Lobe: 4.1 cm x  2.2 cm x 1.7 cm. 4 mm echogenic inferior nodule unchanged in retrospect since 12/14/2018\par Left Lobe: 5.5 cm x 1.5 cm x 1.6 cm. Echo poor mid nodule 7 x 4 x 4 mm unchanged from 12/14/2018\par Isthmus: 5 mm.\par Heterogeneous parenchyma consistent with thyroiditis.\par Cervical Lymph Nodes: No enlarged or abnormal morphology cervical nodes.\par IMPRESSION:\par Thyroiditis with subcentimeter nodules unchanged\par  \par \par ===============================================\par Labs 11/8/19\par Gluc 98\par Cr 0.97, GFR 63\par A1c 5.8%\par FT4 1.53, TSH 0.726\par Ca 9.4, Vit D 39.4\par \par Labs 2/28/20\par Gluc 107, A1c 5.6%\par Cr 0.99, GFR 61\par Ca 9.4, Vit D 43\par TSH 0.251, FT4 1.78\par \par Labs 7/2/20\par Gluc 104, A1c 5.8\par Cr 0.96, GFR 64\par TSH 2.570\par Vit D 35.6, Ca 9.2\par \par Labs 10/2/20\par Gluc 100, A1c 5.9\par Cr 0.98, GFR 62\par TSH 5.960, FT4 1.14\par Vit D 43, ca 9.0\par \par "Labs 3/3/21 reviewed\par TSH low, needs less thyroid hormone - decrease Synthroid to 75 mcg daily and repeat TFTS 8 weeks\par Calcium and vitamin D normal\par stable PreDM w a1c 5.8"\par

## 2022-07-01 NOTE — ASSESSMENT
[FreeTextEntry1] : 1. Hypothyroid - normal TSH, clinically euthyroid\par - cont Synthroid 75 mcg 1 tab daily\par - repeat TFTs 1 week before next visit\par \par 2. small thyroid nodules - stable\par -  repeat sono Fall 2022 to monitor size and appearance of nodule\par \par 3. PreDM - improved\par - cont to monitor w labs\par - cont diet and exercise\par \par 4. Osteopenia - stable on recent DXA vit D and calcium okay, asymptomatic. \par - DXA due summer 2024\par - encouraged weight bearing exercises\par - cont Vit D and calcium\par - monitor calcium and vit D level\par \par 5. Obesity - weigth despite diet and exercise\par - trial of  mg BID, discussed GLP1 agonist in future\par \par

## 2022-07-01 NOTE — HISTORY OF PRESENT ILLNESS
[FreeTextEntry1] : Interval Hx: 'hard time losing weight" despite low carb diet and healthy meals, exercising more\par \par Hypothyroid Dx 2010 on routine blood test, on Synthroid (AKBAR) 75 mcg 1 tab daily - adherent with dosing and administration\par Left thyroid nodule DX 2016 on sonogram\par Current sx: denies anterior neck pain, dysphagia, no voice changes\par -------------------------------------------------------\par Osteopenia on DXA 2016 and 2018 - h/o primary hyperparathyroidism s/p left inferior parathyroid adenoma removal in 2016 \par Current meds: on Vit D3 1000 units daily and calcium >1200 mg daily (calcium tabs + other supplements)\par current sx: denies back/hip pains. denies falls/fracture\par Dairy intake - sometimes yogurt/cheese. occasional milk\par Exercise - walking\par -----------------------------------------------------------\par PreDM - exercise as above, worsening diet over holidays\par

## 2022-07-01 NOTE — PHYSICAL EXAM
[Alert] : alert [No Acute Distress] : no acute distress [EOMI] : extra ocular movement intact [No LAD] : no lymphadenopathy [Thyroid Not Enlarged] : the thyroid was not enlarged [No Thyroid Nodules] : no palpable thyroid nodules [No Accessory Muscle Use] : no accessory muscle use [Clear to Auscultation] : lungs were clear to auscultation bilaterally [Normal S1, S2] : normal S1 and S2 [Normal Rate] : heart rate was normal [No Edema] : no peripheral edema [Not Tender] : non-tender [Soft] : abdomen soft [Oriented x3] : oriented to person, place, and time [Normal Affect] : the affect was normal [Normal Insight/Judgement] : insight and judgment were intact [Normal Mood] : the mood was normal

## 2022-07-01 NOTE — REVIEW OF SYSTEMS
[As Noted in HPI] : as noted in HPI [Chest Pain] : no chest pain [Shortness Of Breath] : no shortness of breath [SOB on Exertion] : no shortness of breath on exertion [Nausea] : no nausea [Abdominal Pain] : no abdominal pain [Polyuria] : no polyuria [Nocturia] : no nocturia [Tremors] : no tremors [Depression] : no depression [Anxiety] : no anxiety [Polydipsia] : no polydipsia

## 2022-08-15 ENCOUNTER — NON-APPOINTMENT (OUTPATIENT)
Age: 67
End: 2022-08-15

## 2022-10-09 ENCOUNTER — RX RENEWAL (OUTPATIENT)
Age: 67
End: 2022-10-09

## 2022-10-10 ENCOUNTER — APPOINTMENT (OUTPATIENT)
Dept: UROLOGY | Facility: CLINIC | Age: 67
End: 2022-10-10

## 2022-10-10 VITALS
BODY MASS INDEX: 29.88 KG/M2 | OXYGEN SATURATION: 100 % | SYSTOLIC BLOOD PRESSURE: 173 MMHG | WEIGHT: 175 LBS | RESPIRATION RATE: 18 BRPM | HEIGHT: 64 IN | DIASTOLIC BLOOD PRESSURE: 95 MMHG | HEART RATE: 78 BPM

## 2022-10-10 DIAGNOSIS — N13.30 UNSPECIFIED HYDRONEPHROSIS: ICD-10-CM

## 2022-10-10 PROCEDURE — 99213 OFFICE O/P EST LOW 20 MIN: CPT

## 2022-10-10 NOTE — ASSESSMENT
[FreeTextEntry1] : 66 yo F with LEFT hydronephrosis with duplicated system and history of ureteral obstruction, s/p endopyelotomy of lower pole moeity 1/2020. Doing well. RBUS showed mild hydro. Will obtain repeat RBUS now and in 1 year. Pt will return sooner PRN.

## 2022-10-10 NOTE — HISTORY OF PRESENT ILLNESS
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 This began weeks ago, but has since resolved. \par It was moderate in severity. Nothing makes the symptoms better, nothing makes sx worse though patient thinks it may have been associated with a Protein Shake diet she was doing at the time. It is associated with nothing. Recent renal US showed increased LEFT hydronephrosis. UA (6/2019) showed 6 RBCs/hpf. She had a ureteroscopy several years ago for obstruction of her duplicate ureter.  \par No hematuria, no dysuria, no frequency, no urgency, no hesitancy, no straining. No incontinence. \par No fevers, no chills, no nausea, no vomiting. Cr 0.88 (9/2019).No family history contributory to LEFT hydronephrosis. \par \par 11/08/2019: Patient presents for follow up. She obtained CTU and is here to discuss results. She denies any new  symptoms and other medical  issues remain unchanged from above. No hematuria, no dysuria, no frequency, no urgency, no hesitancy, no straining. No incontinence. No fevers, no chills, no nausea, no vomiting, no flank pain.  CTU (11/1/19): LEFT duplicated renal collecting system, LEFT hydronephrosis and proximal hydroureter, with abrupt transition from dilated to collapsed in proximal ureter. \par \par 05/22/2020: Patient presents for follow up. She reports occasional LEFT flank pain, which is positional and mild. No other  symptoms and other medical  issues remain unchanged from above. No hematuria, no dysuria, no frequency, no urgency, no hesitancy, no straining. No incontinence. No fevers, no chills, no nausea, no vomiting. RBUS (3/2020) showed decreased LEFT hydronephrosis. Cr (2/2020) 0.99 \par \par 11/16/2020: Patient presents for follow up. She reports  symptoms and other medical  issues remain unchanged from above. No hematuria, no dysuria, no frequency, no urgency, no hesitancy, no straining. No incontinence. No fevers, no chills, no nausea, no vomiting, no flank pain. Cr (10/2020): 0.98. RBUS (10/2020) with mild hydro, unchanged from (3/2020)\par \par 05/24/2021: Patient presents for follow up. She denies any new  symptoms. No hematuria, no dysuria, no frequency, no urgency, no hesitancy, no straining. No incontinence. No fevers, no chills, no nausea, no vomiting, no flank pain. No new imaging. Cr 0.95 (4/2021)\par \par 10/11/2021: Patient presents for follow up. She denies any new  symptoms. No hematuria, no dysuria, no frequency, no urgency, no hesitancy, no straining. No incontinence. No fevers, no chills, no nausea, no vomiting, no flank pain. RBUS shows stable mild LEFT hydro. Cr 1.04 (7/2021)\par \par 10/10/2022: Patient presents for follow up. She denies any flank pain or new  symptoms and other medical  issues remain unchanged from above. No hematuria, no dysuria, no frequency, no urgency, no hesitancy, no straining. No incontinence. No fevers, no chills, no nausea, no vomiting, no flank pain. Has not repeated RBUS yet. Cr 1.02 (6/2022)

## 2022-12-26 LAB
ALBUMIN SERPL ELPH-MCNC: 4.3 G/DL
ANION GAP SERPL CALC-SCNC: 9 MMOL/L
BUN SERPL-MCNC: 17 MG/DL
CALCIUM SERPL-MCNC: 9.7 MG/DL
CHLORIDE SERPL-SCNC: 103 MMOL/L
CO2 SERPL-SCNC: 29 MMOL/L
CREAT SERPL-MCNC: 1.02 MG/DL
EGFR: 61 ML/MIN/1.73M2
ESTIMATED AVERAGE GLUCOSE: 120 MG/DL
GLUCOSE SERPL-MCNC: 103 MG/DL
HBA1C MFR BLD HPLC: 5.8 %
POTASSIUM SERPL-SCNC: 5.1 MMOL/L
SODIUM SERPL-SCNC: 141 MMOL/L
T4 FREE SERPL-MCNC: 1.4 NG/DL
TSH SERPL-ACNC: 2.22 UIU/ML

## 2022-12-29 ENCOUNTER — APPOINTMENT (OUTPATIENT)
Dept: ENDOCRINOLOGY | Facility: CLINIC | Age: 67
End: 2022-12-29
Payer: MEDICARE

## 2022-12-29 VITALS
OXYGEN SATURATION: 99 % | HEIGHT: 65 IN | HEART RATE: 81 BPM | DIASTOLIC BLOOD PRESSURE: 80 MMHG | SYSTOLIC BLOOD PRESSURE: 134 MMHG | WEIGHT: 190 LBS | BODY MASS INDEX: 31.65 KG/M2

## 2022-12-29 PROCEDURE — 99214 OFFICE O/P EST MOD 30 MIN: CPT

## 2022-12-29 RX ORDER — METFORMIN HYDROCHLORIDE 500 MG/1
500 TABLET, COATED ORAL
Qty: 180 | Refills: 1 | Status: DISCONTINUED | COMMUNITY
Start: 2022-07-01 | End: 2022-12-29

## 2022-12-29 NOTE — ASSESSMENT
[FreeTextEntry1] : 1. Hypothyroid - normal TSH, clinically euthyroid\par - cont Synthroid 75 mcg 1 tab daily, [facility]\par - repeat TFTs 1 week before next visit\par \par 2. small thyroid nodules - stable\par -  repeat sono needed to monitor size and appearance of nodule\par \par 3. PreDM - stable, intolerant to MFN\par - cont to monitor w labs\par - cont diet and exercise\par \par 4. Osteopenia - stable on recent DXA vit D and calcium okay, asymptomatic. \par - DXA due summer 2024\par - encouraged weight bearing exercises\par - cont Vit D and calcium\par - monitor calcium and vit D level\par \par 5. Obesity - intolerant to MFN\par - counseled pt on lifestyle changes with diet, discussed realistic goals, advised that she try different diet programs to find the right one for her and being consistent with her meals/food choices\par \par

## 2022-12-29 NOTE — HISTORY OF PRESENT ILLNESS
[FreeTextEntry1] : Interval Hx: had to stop MFN due to diarrhea\par \par Hypothyroid Dx 2010 on routine blood test, on Synthroid (AKBAR) 75 mcg 1 tab daily - adherent with dosing and administration\par Left thyroid nodule DX 2016 on sonogram\par Current sx: denies anterior neck pain, dysphagia, no voice changes\par -------------------------------------------------------\par Osteopenia on DXA 2016 and 2018 - h/o primary hyperparathyroidism s/p left inferior parathyroid adenoma removal in 2016 \par Current meds: on Vit D3 1000 units daily and calcium >1200 mg daily (calcium tabs + other supplements)\par current sx: denies back/hip pains. denies falls/fracture\par Dairy intake - sometimes yogurt/cheese. occasional milk\par Exercise - walking\par -----------------------------------------------------------\par PreDM - exercise as above, worsening diet over holidays\par

## 2023-01-10 ENCOUNTER — OUTPATIENT (OUTPATIENT)
Dept: OUTPATIENT SERVICES | Facility: HOSPITAL | Age: 68
LOS: 1 days | End: 2023-01-10
Payer: MEDICARE

## 2023-01-10 ENCOUNTER — APPOINTMENT (OUTPATIENT)
Dept: ULTRASOUND IMAGING | Facility: CLINIC | Age: 68
End: 2023-01-10
Payer: MEDICARE

## 2023-01-10 DIAGNOSIS — Z90.09 ACQUIRED ABSENCE OF OTHER PART OF HEAD AND NECK: Chronic | ICD-10-CM

## 2023-01-10 DIAGNOSIS — Z90.49 ACQUIRED ABSENCE OF OTHER SPECIFIED PARTS OF DIGESTIVE TRACT: Chronic | ICD-10-CM

## 2023-01-10 DIAGNOSIS — Z98.890 OTHER SPECIFIED POSTPROCEDURAL STATES: Chronic | ICD-10-CM

## 2023-01-10 DIAGNOSIS — E04.1 NONTOXIC SINGLE THYROID NODULE: ICD-10-CM

## 2023-01-10 PROCEDURE — 76536 US EXAM OF HEAD AND NECK: CPT

## 2023-01-10 PROCEDURE — 76536 US EXAM OF HEAD AND NECK: CPT | Mod: 26

## 2023-01-17 ENCOUNTER — TRANSCRIPTION ENCOUNTER (OUTPATIENT)
Age: 68
End: 2023-01-17

## 2023-02-15 ENCOUNTER — NON-APPOINTMENT (OUTPATIENT)
Age: 68
End: 2023-02-15

## 2023-02-15 ENCOUNTER — APPOINTMENT (OUTPATIENT)
Dept: CARDIOLOGY | Facility: CLINIC | Age: 68
End: 2023-02-15
Payer: MEDICARE

## 2023-02-15 VITALS
WEIGHT: 186 LBS | HEART RATE: 80 BPM | OXYGEN SATURATION: 98 % | SYSTOLIC BLOOD PRESSURE: 134 MMHG | BODY MASS INDEX: 30.99 KG/M2 | HEIGHT: 65 IN | DIASTOLIC BLOOD PRESSURE: 80 MMHG

## 2023-02-15 DIAGNOSIS — R94.31 ABNORMAL ELECTROCARDIOGRAM [ECG] [EKG]: ICD-10-CM

## 2023-02-15 DIAGNOSIS — R00.2 PALPITATIONS: ICD-10-CM

## 2023-02-15 DIAGNOSIS — I45.10 UNSPECIFIED RIGHT BUNDLE-BRANCH BLOCK: ICD-10-CM

## 2023-02-15 PROCEDURE — 93000 ELECTROCARDIOGRAM COMPLETE: CPT | Mod: 59

## 2023-02-15 PROCEDURE — 99204 OFFICE O/P NEW MOD 45 MIN: CPT | Mod: 25

## 2023-02-15 PROCEDURE — 93242 EXT ECG>48HR<7D RECORDING: CPT

## 2023-02-15 NOTE — HISTORY OF PRESENT ILLNESS
[FreeTextEntry1] : Pt is a 68 y/o F who is referred here today by their PCP for evaluation.  She has PMH HLD, RBBB, preDM, BMI 30, hypothyroid.  A few weeks ago she woke up and felt palptiations which lasted minutes.  She has not felt any palpitations since then.  She also noted neck tightness when walking the other day.  She denies CP, SOB, diaphoresis, dizziness, syncope, LE edema, PND, orthopnea. \par Home 's/70-80's\par Fam hx: mother ?HF 91\par \par TTE done 12/2017 which showed normal LV function without significant valvular pathology\par Nuclear stress test 01/2018 was negative for ischemia\par \par \par

## 2023-02-15 NOTE — DISCUSSION/SUMMARY
[FreeTextEntry1] : Pt is a 68 y/o F who is referred here today by their PCP for evaluation.  She has PMH HLD, RBBB, preDM, BMI 30, hypothyroid.  A few weeks ago she woke up and felt palptiations which lasted minutes.  She has not felt any palpitations since then.  She also noted neck tightness when walking the other day.  \par \par palpitations/neck tightness:\par Given pt's symptoms will check barron monitor to assess for ectopy.  Advised adequate hydration.  Drug use, OTC medication use, caffeine consumption reviewed\par Will check transthoracic echocardiogram to evaluate left ventricular function and assess for any structural abnormalities\par check CCTA to eval for CAD\par check CUS\par \par HLD:\par Advised lifestyle modifications \par repeat labs \par if still high, start statin\par \par Pt will return in 6-12 mnths or sooner as needed but I encouraged communication via phone or portal if necessary. \par The described plan was discussed with the pt.  All questions and concerns were addressed to the best of my knowledge.\par

## 2023-02-27 LAB
ALBUMIN SERPL ELPH-MCNC: 4.4 G/DL
ALP BLD-CCNC: 93 U/L
ALT SERPL-CCNC: 17 U/L
ANION GAP SERPL CALC-SCNC: 11 MMOL/L
AST SERPL-CCNC: 19 U/L
BASOPHILS # BLD AUTO: 0.03 K/UL
BASOPHILS NFR BLD AUTO: 0.8 %
BILIRUB SERPL-MCNC: 0.5 MG/DL
BUN SERPL-MCNC: 19 MG/DL
CALCIUM SERPL-MCNC: 9.6 MG/DL
CHLORIDE SERPL-SCNC: 105 MMOL/L
CHOLEST SERPL-MCNC: 250 MG/DL
CO2 SERPL-SCNC: 27 MMOL/L
CREAT SERPL-MCNC: 1.01 MG/DL
EGFR: 62 ML/MIN/1.73M2
EOSINOPHIL # BLD AUTO: 0.05 K/UL
EOSINOPHIL NFR BLD AUTO: 1.4 %
ESTIMATED AVERAGE GLUCOSE: 114 MG/DL
FOLATE SERPL-MCNC: 16.7 NG/ML
GLUCOSE SERPL-MCNC: 104 MG/DL
HBA1C MFR BLD HPLC: 5.6 %
HCT VFR BLD CALC: 41.3 %
HDLC SERPL-MCNC: 51 MG/DL
HGB BLD-MCNC: 13.6 G/DL
IMM GRANULOCYTES NFR BLD AUTO: 0 %
LDLC SERPL CALC-MCNC: 172 MG/DL
LYMPHOCYTES # BLD AUTO: 1.19 K/UL
LYMPHOCYTES NFR BLD AUTO: 32.4 %
MAGNESIUM SERPL-MCNC: 1.9 MG/DL
MAN DIFF?: NORMAL
MCHC RBC-ENTMCNC: 31.5 PG
MCHC RBC-ENTMCNC: 32.9 GM/DL
MCV RBC AUTO: 95.6 FL
MONOCYTES # BLD AUTO: 0.28 K/UL
MONOCYTES NFR BLD AUTO: 7.6 %
NEUTROPHILS # BLD AUTO: 2.12 K/UL
NEUTROPHILS NFR BLD AUTO: 57.8 %
NONHDLC SERPL-MCNC: 199 MG/DL
PLATELET # BLD AUTO: 280 K/UL
POTASSIUM SERPL-SCNC: 5.2 MMOL/L
PROT SERPL-MCNC: 7.2 G/DL
RBC # BLD: 4.32 M/UL
RBC # FLD: 12.5 %
SODIUM SERPL-SCNC: 144 MMOL/L
TRIGL SERPL-MCNC: 131 MG/DL
TSH SERPL-ACNC: 1.02 UIU/ML
VIT B12 SERPL-MCNC: >2000 PG/ML
WBC # FLD AUTO: 3.67 K/UL

## 2023-03-02 ENCOUNTER — APPOINTMENT (OUTPATIENT)
Dept: CARDIOLOGY | Facility: CLINIC | Age: 68
End: 2023-03-02
Payer: MEDICARE

## 2023-03-02 PROCEDURE — 93306 TTE W/DOPPLER COMPLETE: CPT

## 2023-03-03 ENCOUNTER — TRANSCRIPTION ENCOUNTER (OUTPATIENT)
Age: 68
End: 2023-03-03

## 2023-03-06 ENCOUNTER — TRANSCRIPTION ENCOUNTER (OUTPATIENT)
Age: 68
End: 2023-03-06

## 2023-03-08 ENCOUNTER — OUTPATIENT (OUTPATIENT)
Dept: OUTPATIENT SERVICES | Facility: HOSPITAL | Age: 68
LOS: 1 days | End: 2023-03-08
Payer: MEDICARE

## 2023-03-08 ENCOUNTER — RESULT REVIEW (OUTPATIENT)
Age: 68
End: 2023-03-08

## 2023-03-08 ENCOUNTER — APPOINTMENT (OUTPATIENT)
Dept: CT IMAGING | Facility: CLINIC | Age: 68
End: 2023-03-08
Payer: MEDICARE

## 2023-03-08 DIAGNOSIS — R00.2 PALPITATIONS: ICD-10-CM

## 2023-03-08 DIAGNOSIS — Z90.09 ACQUIRED ABSENCE OF OTHER PART OF HEAD AND NECK: Chronic | ICD-10-CM

## 2023-03-08 DIAGNOSIS — Z98.890 OTHER SPECIFIED POSTPROCEDURAL STATES: Chronic | ICD-10-CM

## 2023-03-08 DIAGNOSIS — Z90.49 ACQUIRED ABSENCE OF OTHER SPECIFIED PARTS OF DIGESTIVE TRACT: Chronic | ICD-10-CM

## 2023-03-08 PROCEDURE — 75574 CT ANGIO HRT W/3D IMAGE: CPT | Mod: MH

## 2023-03-08 PROCEDURE — 75574 CT ANGIO HRT W/3D IMAGE: CPT | Mod: 26,MH

## 2023-03-13 ENCOUNTER — TRANSCRIPTION ENCOUNTER (OUTPATIENT)
Age: 68
End: 2023-03-13

## 2023-03-23 ENCOUNTER — APPOINTMENT (OUTPATIENT)
Dept: FAMILY MEDICINE | Facility: CLINIC | Age: 68
End: 2023-03-23
Payer: MEDICARE

## 2023-03-23 VITALS
HEIGHT: 65 IN | HEART RATE: 94 BPM | BODY MASS INDEX: 31.32 KG/M2 | DIASTOLIC BLOOD PRESSURE: 84 MMHG | OXYGEN SATURATION: 97 % | SYSTOLIC BLOOD PRESSURE: 138 MMHG | WEIGHT: 188 LBS | TEMPERATURE: 97.3 F

## 2023-03-23 DIAGNOSIS — L65.9 NONSCARRING HAIR LOSS, UNSPECIFIED: ICD-10-CM

## 2023-03-23 DIAGNOSIS — Z00.00 ENCOUNTER FOR GENERAL ADULT MEDICAL EXAMINATION W/OUT ABNORMAL FINDINGS: ICD-10-CM

## 2023-03-23 PROCEDURE — G0402 INITIAL PREVENTIVE EXAM: CPT

## 2023-03-23 PROCEDURE — 36415 COLL VENOUS BLD VENIPUNCTURE: CPT

## 2023-03-23 PROCEDURE — 81003 URINALYSIS AUTO W/O SCOPE: CPT | Mod: QW

## 2023-03-23 PROCEDURE — G0403: CPT

## 2023-03-24 ENCOUNTER — LABORATORY RESULT (OUTPATIENT)
Age: 68
End: 2023-03-24

## 2023-03-27 NOTE — HEALTH RISK ASSESSMENT
[Good] : ~his/her~  mood as  good [Yes] : Yes [Monthly or less (1 pt)] : Monthly or less (1 point) [1 or 2 (0 pts)] : 1 or 2 (0 points) [Never (0 pts)] : Never (0 points) [No] : In the past 12 months have you used drugs other than those required for medical reasons? No [0] : 2) Feeling down, depressed, or hopeless: Not at all (0) [PHQ-2 Negative - No further assessment needed] : PHQ-2 Negative - No further assessment needed [I have developed a follow-up plan documented below in the note.] : I have developed a follow-up plan documented below in the note. [None] : None [With Family] : lives with family [Retired] : retired [] :  [Feels Safe at Home] : Feels safe at home [Fully functional (bathing, dressing, toileting, transferring, walking, feeding)] : Fully functional (bathing, dressing, toileting, transferring, walking, feeding) [Fully functional (using the telephone, shopping, preparing meals, housekeeping, doing laundry, using] : Fully functional and needs no help or supervision to perform IADLs (using the telephone, shopping, preparing meals, housekeeping, doing laundry, using transportation, managing medications and managing finances) [Never] : Never [Audit-CScore] : 1 [[id number]] : 0 [Patient reported mammogram was normal] : Patient reported mammogram was normal [Patient reported PAP Smear was normal] : Patient reported PAP Smear was normal [Patient reported bone density results were abnormal] : Patient reported bone density results were abnormal [MammogramDate] : 08/22 [PapSmearDate] : 03/23 [BoneDensityDate] : 06/22 [BoneDensityComments] : osteopenia [ColonoscopyComments] : n

## 2023-03-27 NOTE — PLAN
[FreeTextEntry1] : BW drawn today\par Encourage 150 minutes of exercise a week\par Encourage well balanced diet, limit fatty foods and processed foods\par Discussed hair loss options: rosemary oil, hair serums etc.

## 2023-03-27 NOTE — HISTORY OF PRESENT ILLNESS
[de-identified] : 68yo F presents for CPE. Pt reports she is doing well however she is very concerned with her weight. Pt reports despite trying to lose weight she is not able to. Pt is seeing a functional doctor and would like labs drawn today for him. Pt also states she is having worsening hair loss that she believes is due to her thyroid. Pt sees gyencologist Dr. Luna and reports her last pap was last month. Pt is sleeping well, eating a well balanced diet and working out. \par Pt denies any CP, palpitations, SOB, BURTON, fevers, chills, abdominal pain, N/V, diarrhea, constipation, BRBPR or dark tarry stools.\par

## 2023-03-28 ENCOUNTER — APPOINTMENT (OUTPATIENT)
Age: 68
End: 2023-03-28

## 2023-03-28 LAB
25(OH)D3 SERPL-MCNC: 47.3 NG/ML
ALBUMIN SERPL ELPH-MCNC: 4.4 G/DL
ALP BLD-CCNC: 96 U/L
ALT SERPL-CCNC: 14 U/L
ANA PAT FLD IF-IMP: ABNORMAL
ANA SER IF-ACNC: ABNORMAL
ANION GAP SERPL CALC-SCNC: 14 MMOL/L
APPEARANCE: CLEAR
AST SERPL-CCNC: 16 U/L
BACTERIA: ABNORMAL
BASOPHILS # BLD AUTO: 0.04 K/UL
BASOPHILS NFR BLD AUTO: 0.8 %
BILIRUB SERPL-MCNC: 0.6 MG/DL
BILIRUBIN URINE: NEGATIVE
BLOOD URINE: ABNORMAL
BUN SERPL-MCNC: 17 MG/DL
CALCIUM SERPL-MCNC: 9.4 MG/DL
CHLORIDE SERPL-SCNC: 100 MMOL/L
CHOLEST SERPL-MCNC: 165 MG/DL
CO2 SERPL-SCNC: 25 MMOL/L
COLOR: YELLOW
CORTIS SERPL-MCNC: 16.1 UG/DL
CREAT SERPL-MCNC: 0.97 MG/DL
CRP SERPL-MCNC: 10 MG/L
DHEA-S SERPL-MCNC: 60.9 UG/DL
EBV EA AB SER IA-ACNC: <5 U/ML
EBV EA AB TITR SER IF: POSITIVE
EBV EA IGG SER QL IA: >600 U/ML
EBV EA IGG SER-ACNC: NEGATIVE
EBV EA IGM SER IA-ACNC: NEGATIVE
EBV PATRN SPEC IB-IMP: NORMAL
EBV VCA IGG SER IA-ACNC: >750 U/ML
EBV VCA IGM SER QL IA: <10 U/ML
EGFR: 65 ML/MIN/1.73M2
EOSINOPHIL # BLD AUTO: 0.06 K/UL
EOSINOPHIL NFR BLD AUTO: 1.2 %
EPSTEIN-BARR VIRUS CAPSID ANTIGEN IGG: POSITIVE
ERYTHROCYTE [SEDIMENTATION RATE] IN BLOOD BY WESTERGREN METHOD: 33 MM/HR
ESTIMATED AVERAGE GLUCOSE: 114 MG/DL
ESTRADIOL SERPL-MCNC: 8 PG/ML
FERRITIN SERPL-MCNC: 96 NG/ML
FOLATE SERPL-MCNC: 16.8 NG/ML
FSH SERPL-MCNC: 97.1 IU/L
GLUCOSE QUALITATIVE U: NEGATIVE
GLUCOSE SERPL-MCNC: 102 MG/DL
HBA1C MFR BLD HPLC: 5.6 %
HCT VFR BLD CALC: 40.3 %
HDLC SERPL-MCNC: 55 MG/DL
HGB BLD-MCNC: 13.3 G/DL
HYALINE CASTS: 0 /LPF
IGE SER-MCNC: 25 KU/L
IMM GRANULOCYTES NFR BLD AUTO: 0.2 %
INSULIN SERPL-MCNC: 11.4 UU/ML
IRON SATN MFR SERPL: 20 %
IRON SERPL-MCNC: 71 UG/DL
KETONES URINE: NEGATIVE
LDLC SERPL CALC-MCNC: 83 MG/DL
LEUKOCYTE ESTERASE URINE: ABNORMAL
LH SERPL-ACNC: 51.4 IU/L
LYMPHOCYTES # BLD AUTO: 1 K/UL
LYMPHOCYTES NFR BLD AUTO: 20.3 %
MAN DIFF?: NORMAL
MCHC RBC-ENTMCNC: 30.8 PG
MCHC RBC-ENTMCNC: 33 GM/DL
MCV RBC AUTO: 93.3 FL
MICROSCOPIC-UA: NORMAL
MONOCYTES # BLD AUTO: 0.32 K/UL
MONOCYTES NFR BLD AUTO: 6.5 %
NEUTROPHILS # BLD AUTO: 3.5 K/UL
NEUTROPHILS NFR BLD AUTO: 71 %
NITRITE URINE: NEGATIVE
NONHDLC SERPL-MCNC: 110 MG/DL
PH URINE: 7
PLATELET # BLD AUTO: 255 K/UL
POTASSIUM SERPL-SCNC: 3.9 MMOL/L
PROGEST SERPL-MCNC: 0.4 NG/ML
PROT SERPL-MCNC: 7.3 G/DL
PROTEIN URINE: ABNORMAL
RBC # BLD: 4.32 M/UL
RBC # FLD: 12.3 %
RED BLOOD CELLS URINE: 12 /HPF
SODIUM SERPL-SCNC: 139 MMOL/L
SPECIFIC GRAVITY URINE: 1.02
SQUAMOUS EPITHELIAL CELLS: 6 /HPF
T3FREE SERPL-MCNC: 2.51 PG/ML
T3RU NFR SERPL: 1 TBI
T4 FREE SERPL-MCNC: 1.5 NG/DL
T4 SERPL-MCNC: 9.5 UG/DL
TESTOST FREE SERPL-MCNC: 1.6 PG/ML
TESTOST SERPL-MCNC: 20.2 NG/DL
THYROGLOB AB SERPL-ACNC: 7555 IU/ML
THYROPEROXIDASE AB SERPL IA-ACNC: 2674 IU/ML
TIBC SERPL-MCNC: 348 UG/DL
TRIGL SERPL-MCNC: 136 MG/DL
TSH SERPL-ACNC: 0.85 UIU/ML
UIBC SERPL-MCNC: 277 UG/DL
URATE SERPL-MCNC: 5.1 MG/DL
UROBILINOGEN URINE: NORMAL
VIT B12 SERPL-MCNC: >2000 PG/ML
WBC # FLD AUTO: 4.93 K/UL
WHITE BLOOD CELLS URINE: 268 /HPF

## 2023-03-30 ENCOUNTER — APPOINTMENT (OUTPATIENT)
Age: 68
End: 2023-03-30

## 2023-03-30 LAB — HISTAMINE BLD-MCNC: 0.85 NG/ML

## 2023-04-03 ENCOUNTER — APPOINTMENT (OUTPATIENT)
Dept: FAMILY MEDICINE | Facility: CLINIC | Age: 68
End: 2023-04-03
Payer: MEDICARE

## 2023-04-03 ENCOUNTER — APPOINTMENT (OUTPATIENT)
Age: 68
End: 2023-04-03

## 2023-04-03 VITALS
DIASTOLIC BLOOD PRESSURE: 76 MMHG | BODY MASS INDEX: 31.32 KG/M2 | HEART RATE: 92 BPM | HEIGHT: 65 IN | WEIGHT: 188 LBS | OXYGEN SATURATION: 98 % | SYSTOLIC BLOOD PRESSURE: 110 MMHG | TEMPERATURE: 97.2 F

## 2023-04-03 DIAGNOSIS — R10.9 UNSPECIFIED ABDOMINAL PAIN: ICD-10-CM

## 2023-04-03 DIAGNOSIS — N39.0 URINARY TRACT INFECTION, SITE NOT SPECIFIED: ICD-10-CM

## 2023-04-03 LAB
CORTICOSTEROID BIND GLOBULIN: 1.5 MG/DL
CORTIS SERPL-MCNC: 16 UG/DL
CORTISOL, FREE: 6.2 UG/DL
ESTRADIOL SERPL HS-MCNC: 7 PG/ML
ESTRIOL SERPL-MCNC: <0.1 NG/ML
ESTRONE SERPL-MCNC: 30 PG/ML
PFCX: 39 %
PREGNENOLONE-ESOTERIX: 27 NG/DL

## 2023-04-03 PROCEDURE — 81003 URINALYSIS AUTO W/O SCOPE: CPT | Mod: QW

## 2023-04-03 PROCEDURE — 99213 OFFICE O/P EST LOW 20 MIN: CPT | Mod: 25

## 2023-04-03 NOTE — PHYSICAL EXAM
[Normal] : normal rate, regular rhythm, normal S1 and S2 and no murmur heard [Soft] : abdomen soft [Non Tender] : non-tender [Non-distended] : non-distended [No CVA Tenderness] : no CVA  tenderness [Normal Gait] : normal gait [Normal Affect] : the affect was normal

## 2023-04-03 NOTE — PLAN
[FreeTextEntry1] : UA/UC ordered today\par Encourage adequate hydration \par Start Cipro \par Follow up if symptoms don't improve\par if fever, worsening flank pain - go to ED or call office

## 2023-04-03 NOTE — HISTORY OF PRESENT ILLNESS
[FreeTextEntry8] : 66yo F presents complaining of b/l flank pain x 4 days. Pt reports no dysuria, hematuria, pelvic pain, frequency or urgency. Pt has hx of hydronephrosis of the left kidney due to extra ureter and debris build up. Pt has had to have procedures previously due to this. Pt reports right flank pain > left however. Pt denies any fevers, chills or blood in urine.

## 2023-04-04 ENCOUNTER — APPOINTMENT (OUTPATIENT)
Age: 68
End: 2023-04-04

## 2023-04-04 LAB
APPEARANCE: CLEAR
BACTERIA: NEGATIVE
BILIRUBIN URINE: NEGATIVE
BLOOD URINE: NEGATIVE
COLOR: YELLOW
GLUCOSE QUALITATIVE U: NEGATIVE
HYALINE CASTS: 0 /LPF
KETONES URINE: NEGATIVE
LEUKOCYTE ESTERASE URINE: NEGATIVE
MICROSCOPIC-UA: NORMAL
NITRITE URINE: NEGATIVE
PH URINE: 6.5
PROTEIN URINE: NEGATIVE
RED BLOOD CELLS URINE: 0 /HPF
SPECIFIC GRAVITY URINE: 1.01
SQUAMOUS EPITHELIAL CELLS: 0 /HPF
UROBILINOGEN URINE: NORMAL
WHITE BLOOD CELLS URINE: 0 /HPF

## 2023-04-05 ENCOUNTER — APPOINTMENT (OUTPATIENT)
Age: 68
End: 2023-04-05

## 2023-04-05 ENCOUNTER — OUTPATIENT (OUTPATIENT)
Dept: OUTPATIENT SERVICES | Facility: HOSPITAL | Age: 68
LOS: 1 days | End: 2023-04-05
Payer: MEDICARE

## 2023-04-05 ENCOUNTER — APPOINTMENT (OUTPATIENT)
Dept: ULTRASOUND IMAGING | Facility: CLINIC | Age: 68
End: 2023-04-05
Payer: MEDICARE

## 2023-04-05 ENCOUNTER — RESULT REVIEW (OUTPATIENT)
Age: 68
End: 2023-04-05

## 2023-04-05 ENCOUNTER — NON-APPOINTMENT (OUTPATIENT)
Age: 68
End: 2023-04-05

## 2023-04-05 DIAGNOSIS — Z98.890 OTHER SPECIFIED POSTPROCEDURAL STATES: Chronic | ICD-10-CM

## 2023-04-05 DIAGNOSIS — Z90.09 ACQUIRED ABSENCE OF OTHER PART OF HEAD AND NECK: Chronic | ICD-10-CM

## 2023-04-05 DIAGNOSIS — R10.9 UNSPECIFIED ABDOMINAL PAIN: ICD-10-CM

## 2023-04-05 LAB — BACTERIA UR CULT: NORMAL

## 2023-04-05 PROCEDURE — 76775 US EXAM ABDO BACK WALL LIM: CPT

## 2023-04-05 PROCEDURE — 76775 US EXAM ABDO BACK WALL LIM: CPT | Mod: 26

## 2023-04-06 DIAGNOSIS — N13.30 UNSPECIFIED HYDRONEPHROSIS: ICD-10-CM

## 2023-04-06 DIAGNOSIS — R10.9 UNSPECIFIED ABDOMINAL PAIN: ICD-10-CM

## 2023-04-07 ENCOUNTER — NON-APPOINTMENT (OUTPATIENT)
Age: 68
End: 2023-04-07

## 2023-04-17 ENCOUNTER — OUTPATIENT (OUTPATIENT)
Dept: OUTPATIENT SERVICES | Facility: HOSPITAL | Age: 68
LOS: 1 days | End: 2023-04-17
Payer: MEDICARE

## 2023-04-17 ENCOUNTER — APPOINTMENT (OUTPATIENT)
Dept: CT IMAGING | Facility: CLINIC | Age: 68
End: 2023-04-17
Payer: MEDICARE

## 2023-04-17 DIAGNOSIS — R10.9 UNSPECIFIED ABDOMINAL PAIN: ICD-10-CM

## 2023-04-17 DIAGNOSIS — Z90.09 ACQUIRED ABSENCE OF OTHER PART OF HEAD AND NECK: Chronic | ICD-10-CM

## 2023-04-17 DIAGNOSIS — N13.30 UNSPECIFIED HYDRONEPHROSIS: ICD-10-CM

## 2023-04-17 DIAGNOSIS — Z98.890 OTHER SPECIFIED POSTPROCEDURAL STATES: Chronic | ICD-10-CM

## 2023-04-17 DIAGNOSIS — Z90.49 ACQUIRED ABSENCE OF OTHER SPECIFIED PARTS OF DIGESTIVE TRACT: Chronic | ICD-10-CM

## 2023-04-17 PROCEDURE — 74178 CT ABD&PLV WO CNTR FLWD CNTR: CPT | Mod: 26,MH

## 2023-04-17 PROCEDURE — 74178 CT ABD&PLV WO CNTR FLWD CNTR: CPT

## 2023-05-03 ENCOUNTER — APPOINTMENT (OUTPATIENT)
Dept: ENDOCRINOLOGY | Facility: CLINIC | Age: 68
End: 2023-05-03

## 2023-05-18 LAB
25(OH)D3 SERPL-MCNC: 50.8 NG/ML
ALBUMIN SERPL ELPH-MCNC: 4.6 G/DL
ALP BLD-CCNC: 81 U/L
ALT SERPL-CCNC: 16 U/L
ANION GAP SERPL CALC-SCNC: 16 MMOL/L
AST SERPL-CCNC: 17 U/L
BILIRUB SERPL-MCNC: 0.6 MG/DL
BUN SERPL-MCNC: 19 MG/DL
CALCIUM SERPL-MCNC: 10.2 MG/DL
CHLORIDE SERPL-SCNC: 103 MMOL/L
CO2 SERPL-SCNC: 23 MMOL/L
CREAT SERPL-MCNC: 1.01 MG/DL
EGFR: 62 ML/MIN/1.73M2
ESTIMATED AVERAGE GLUCOSE: 114 MG/DL
GLUCOSE SERPL-MCNC: 110 MG/DL
HBA1C MFR BLD HPLC: 5.6 %
POTASSIUM SERPL-SCNC: 5.3 MMOL/L
PROT SERPL-MCNC: 7.3 G/DL
SODIUM SERPL-SCNC: 142 MMOL/L
T4 FREE SERPL-MCNC: 1.6 NG/DL
TSH SERPL-ACNC: 0.95 UIU/ML

## 2023-05-24 ENCOUNTER — APPOINTMENT (OUTPATIENT)
Dept: ENDOCRINOLOGY | Facility: CLINIC | Age: 68
End: 2023-05-24
Payer: MEDICARE

## 2023-05-24 VITALS
DIASTOLIC BLOOD PRESSURE: 82 MMHG | WEIGHT: 176 LBS | SYSTOLIC BLOOD PRESSURE: 126 MMHG | HEIGHT: 65 IN | BODY MASS INDEX: 29.32 KG/M2 | HEART RATE: 78 BPM

## 2023-05-24 DIAGNOSIS — E55.9 VITAMIN D DEFICIENCY, UNSPECIFIED: ICD-10-CM

## 2023-05-24 PROCEDURE — 99214 OFFICE O/P EST MOD 30 MIN: CPT

## 2023-05-24 RX ORDER — CIPROFLOXACIN HYDROCHLORIDE 500 MG/1
500 TABLET, FILM COATED ORAL TWICE DAILY
Qty: 14 | Refills: 0 | Status: DISCONTINUED | COMMUNITY
Start: 2019-09-25 | End: 2023-05-24

## 2023-05-24 NOTE — HISTORY OF PRESENT ILLNESS
[FreeTextEntry1] : Interval Hx:\par lost 10 lbs with seeing nutritionist \par seen cardiology recently, everything was good per patient\par Patient going [place] next on vacation\par \par Hypothyroid Dx 2010 on routine blood test, on Synthroid (AKBAR) 75 mcg 1 tab daily - adherent with dosing and administration\par Left thyroid nodule DX 2016 on sonogram\par Current sx: denies anterior neck pain, dysphagia, no voice changes\par -------------------------------------------------------\par Osteopenia on DXA 2016 and 2018 - h/o primary hyperparathyroidism s/p left inferior parathyroid adenoma removal in 2016 \par Current meds: on Vit D3 5000 units 3 times a week and calcium >1200 mg daily (calcium tabs + other supplements)\par current sx: denies back/hip pains. denies falls/fracture\par Dairy intake - sometimes yogurt/cheese. occasional milk\par Exercise - walking\par -----------------------------------------------------------\par PreDM - exercise as above, and working on diet\par

## 2023-05-24 NOTE — ASSESSMENT
[Importance of Diet and Exercise] : importance of diet and exercise to improve glycemic control, achieve weight loss and improve cardiovascular health [Levothyroxine] : The patient was instructed to take Levothyroxine on an empty stomach, separate from vitamins, and wait at least 30 minutes before eating [FreeTextEntry1] : 1. Hypothyroid - normal TSH, clinically euthyroid\par - cont Synthroid 75 mcg 1 tab daily, [facility]\par - repeat TFTs 1 week before next visit\par \par 2. small thyroid nodules - stable\par -  repeat sono needed to monitor size and appearance of nodule, repeat sonogram in 1/2024\par \par 3. PreDM - stable, intolerant to MFN\par - cont to monitor w labs\par - cont diet and exercise\par - encouraged to loss more weight \par \par 4. Osteopenia - stable on recent DXA vit D and calcium okay, asymptomatic. \par - DXA due summer 2024\par - encouraged weight bearing exercises\par - cont Vit D and calcium\par - monitor calcium and vit D level\par \par 5. Obesity - intolerant to MFN\par - counseled pt on lifestyle changes with diet, discussed realistic goals, advised that she try different diet programs to find the right one for her and being consistent with her meals/food choices\par \par RTO in 4 months with Dr. Antunez

## 2023-05-24 NOTE — REVIEW OF SYSTEMS
[Recent Weight Loss (___ Lbs)] : recent weight loss: [unfilled] lbs [Hair Loss] : hair loss [Fatigue] : no fatigue [Decreased Appetite] : appetite not decreased [Visual Field Defect] : no visual field defect [Blurred Vision] : no blurred vision [Dysphagia] : no dysphagia [Neck Pain] : no neck pain [Dysphonia] : no dysphonia [Chest Pain] : no chest pain [Palpitations] : no palpitations [Constipation] : no constipation [Diarrhea] : no diarrhea [Polyuria] : no polyuria [Dysuria] : no dysuria [Dry Skin] : no dry skin [Headaches] : no headaches [Tremors] : no tremors [Depression] : no depression [Anxiety] : no anxiety [Polydipsia] : no polydipsia [Cold Intolerance] : no cold intolerance [Heat Intolerance] : no heat intolerance [Swelling] : no swelling [FreeTextEntry5] : s

## 2023-09-15 ENCOUNTER — LABORATORY RESULT (OUTPATIENT)
Age: 68
End: 2023-09-15

## 2023-09-15 ENCOUNTER — NON-APPOINTMENT (OUTPATIENT)
Age: 68
End: 2023-09-15

## 2023-09-20 ENCOUNTER — APPOINTMENT (OUTPATIENT)
Dept: ENDOCRINOLOGY | Facility: CLINIC | Age: 68
End: 2023-09-20
Payer: MEDICARE

## 2023-09-20 VITALS
BODY MASS INDEX: 28.32 KG/M2 | WEIGHT: 170 LBS | OXYGEN SATURATION: 95 % | DIASTOLIC BLOOD PRESSURE: 82 MMHG | HEART RATE: 86 BPM | HEIGHT: 65 IN | SYSTOLIC BLOOD PRESSURE: 130 MMHG

## 2023-09-20 DIAGNOSIS — E03.8 OTHER SPECIFIED HYPOTHYROIDISM: ICD-10-CM

## 2023-09-20 PROCEDURE — 99214 OFFICE O/P EST MOD 30 MIN: CPT

## 2023-09-20 RX ORDER — B-COMPLEX WITH VITAMIN C
TABLET ORAL
Refills: 0 | Status: DISCONTINUED | COMMUNITY
End: 2023-09-20

## 2023-10-04 ENCOUNTER — APPOINTMENT (OUTPATIENT)
Dept: FAMILY MEDICINE | Facility: CLINIC | Age: 68
End: 2023-10-04
Payer: MEDICARE

## 2023-10-04 VITALS
HEIGHT: 65 IN | BODY MASS INDEX: 28.32 KG/M2 | TEMPERATURE: 97.7 F | SYSTOLIC BLOOD PRESSURE: 180 MMHG | OXYGEN SATURATION: 98 % | DIASTOLIC BLOOD PRESSURE: 90 MMHG | HEART RATE: 100 BPM | WEIGHT: 170 LBS

## 2023-10-04 DIAGNOSIS — R19.09 OTHER INTRA-ABDOMINAL AND PELVIC SWELLING, MASS AND LUMP: ICD-10-CM

## 2023-10-04 PROCEDURE — 99213 OFFICE O/P EST LOW 20 MIN: CPT

## 2023-10-13 ENCOUNTER — APPOINTMENT (OUTPATIENT)
Dept: ULTRASOUND IMAGING | Facility: CLINIC | Age: 68
End: 2023-10-13
Payer: MEDICARE

## 2023-10-13 ENCOUNTER — OUTPATIENT (OUTPATIENT)
Dept: OUTPATIENT SERVICES | Facility: HOSPITAL | Age: 68
LOS: 1 days | End: 2023-10-13
Payer: MEDICARE

## 2023-10-13 DIAGNOSIS — R19.09 OTHER INTRA-ABDOMINAL AND PELVIC SWELLING, MASS AND LUMP: ICD-10-CM

## 2023-10-13 DIAGNOSIS — R22.42 LOCALIZED SWELLING, MASS AND LUMP, LEFT LOWER LIMB: ICD-10-CM

## 2023-10-13 DIAGNOSIS — Z90.09 ACQUIRED ABSENCE OF OTHER PART OF HEAD AND NECK: Chronic | ICD-10-CM

## 2023-10-13 DIAGNOSIS — Z90.49 ACQUIRED ABSENCE OF OTHER SPECIFIED PARTS OF DIGESTIVE TRACT: Chronic | ICD-10-CM

## 2023-10-13 DIAGNOSIS — Z98.890 OTHER SPECIFIED POSTPROCEDURAL STATES: Chronic | ICD-10-CM

## 2023-10-13 PROCEDURE — 76882 US LMTD JT/FCL EVL NVASC XTR: CPT

## 2023-10-13 PROCEDURE — 76882 US LMTD JT/FCL EVL NVASC XTR: CPT | Mod: 26,LT

## 2023-10-16 ENCOUNTER — APPOINTMENT (OUTPATIENT)
Dept: UROLOGY | Facility: CLINIC | Age: 68
End: 2023-10-16

## 2023-10-17 ENCOUNTER — APPOINTMENT (OUTPATIENT)
Age: 68
End: 2023-10-17

## 2023-11-01 ENCOUNTER — APPOINTMENT (OUTPATIENT)
Dept: SURGERY | Facility: CLINIC | Age: 68
End: 2023-11-01

## 2023-11-28 ENCOUNTER — OUTPATIENT (OUTPATIENT)
Dept: OUTPATIENT SERVICES | Facility: HOSPITAL | Age: 68
LOS: 1 days | End: 2023-11-28
Payer: MEDICARE

## 2023-11-28 ENCOUNTER — APPOINTMENT (OUTPATIENT)
Dept: MRI IMAGING | Facility: CLINIC | Age: 68
End: 2023-11-28
Payer: MEDICARE

## 2023-11-28 DIAGNOSIS — Z98.890 OTHER SPECIFIED POSTPROCEDURAL STATES: Chronic | ICD-10-CM

## 2023-11-28 DIAGNOSIS — M25.552 PAIN IN LEFT HIP: ICD-10-CM

## 2023-11-28 DIAGNOSIS — Z90.09 ACQUIRED ABSENCE OF OTHER PART OF HEAD AND NECK: Chronic | ICD-10-CM

## 2023-11-28 DIAGNOSIS — M79.605 PAIN IN LEFT LEG: ICD-10-CM

## 2023-11-28 DIAGNOSIS — Z90.49 ACQUIRED ABSENCE OF OTHER SPECIFIED PARTS OF DIGESTIVE TRACT: Chronic | ICD-10-CM

## 2023-11-28 PROCEDURE — 73721 MRI JNT OF LWR EXTRE W/O DYE: CPT | Mod: 26,LT,MH

## 2023-11-28 PROCEDURE — 73721 MRI JNT OF LWR EXTRE W/O DYE: CPT | Mod: MH

## 2023-12-08 ENCOUNTER — NON-APPOINTMENT (OUTPATIENT)
Age: 68
End: 2023-12-08

## 2023-12-15 ENCOUNTER — RX RENEWAL (OUTPATIENT)
Age: 68
End: 2023-12-15

## 2023-12-15 RX ORDER — ATORVASTATIN CALCIUM 20 MG/1
20 TABLET, FILM COATED ORAL
Qty: 90 | Refills: 1 | Status: ACTIVE | COMMUNITY
Start: 2023-02-27 | End: 1900-01-01

## 2023-12-19 ENCOUNTER — TRANSCRIPTION ENCOUNTER (OUTPATIENT)
Age: 68
End: 2023-12-19

## 2023-12-19 ENCOUNTER — APPOINTMENT (OUTPATIENT)
Dept: FAMILY MEDICINE | Facility: CLINIC | Age: 68
End: 2023-12-19
Payer: MEDICARE

## 2023-12-19 VITALS
TEMPERATURE: 98 F | WEIGHT: 170 LBS | HEART RATE: 89 BPM | SYSTOLIC BLOOD PRESSURE: 136 MMHG | OXYGEN SATURATION: 98 % | HEIGHT: 65 IN | BODY MASS INDEX: 28.32 KG/M2 | DIASTOLIC BLOOD PRESSURE: 80 MMHG

## 2023-12-19 DIAGNOSIS — J01.80 OTHER ACUTE SINUSITIS: ICD-10-CM

## 2023-12-19 PROCEDURE — 99213 OFFICE O/P EST LOW 20 MIN: CPT

## 2023-12-19 NOTE — HISTORY OF PRESENT ILLNESS
[FreeTextEntry8] : MARISSA is a 68 year old female here for c/o of cough and congestion for approx two weeks.  67 yo female c nearly 2 week hx of  feeling ill.   Resolved laryngitis, improved, but persistent cough c yellow phlegm  +ve nasal congestion c yellow nasal d/c  no f/c/s  no N/V/D no abd pain +ve good appetite  +ve R ear pain  +ve significant B/L frontal head associated c ocular  pressure B/L  no rashes    no body aches   NEGATIVE Home COVID-19 test 12/3/23    mild improvement c OTC Mucinex D and OTC Robitussin DM

## 2023-12-19 NOTE — PHYSICAL EXAM
[No Acute Distress] : no acute distress [Well Nourished] : well nourished [Well Developed] : well developed [Well-Appearing] : well-appearing [EOMI] : extraocular movements intact [No JVD] : no jugular venous distention [No Respiratory Distress] : no respiratory distress  [No Accessory Muscle Use] : no accessory muscle use [Clear to Auscultation] : lungs were clear to auscultation bilaterally [Normal Rate] : normal rate  [Regular Rhythm] : with a regular rhythm [Normal S1, S2] : normal S1 and S2 [No Carotid Bruits] : no carotid bruits [No Edema] : there was no peripheral edema [Non-distended] : non-distended [No CVA Tenderness] : no CVA  tenderness [Grossly Normal Strength/Tone] : grossly normal strength/tone [No Rash] : no rash [Coordination Grossly Intact] : coordination grossly intact [No Focal Deficits] : no focal deficits [Normal Gait] : normal gait [Normal Affect] : the affect was normal [Normal Mood] : the mood was normal [Normal Insight/Judgement] : insight and judgment were intact [de-identified] : +ve PND post pharynx, +ve tonsillar edema R side

## 2023-12-19 NOTE — PLAN
[FreeTextEntry1] : aggressive hydration!!!!!!!  OTC Flonase  2 sp ea nostril qd  check RVP  see med orders

## 2023-12-20 LAB
RAPID RVP RESULT: NOT DETECTED
SARS-COV-2 RNA PNL RESP NAA+PROBE: NOT DETECTED

## 2024-01-10 ENCOUNTER — APPOINTMENT (OUTPATIENT)
Dept: FAMILY MEDICINE | Facility: CLINIC | Age: 69
End: 2024-01-10
Payer: MEDICARE

## 2024-01-10 VITALS
WEIGHT: 174 LBS | TEMPERATURE: 98 F | OXYGEN SATURATION: 98 % | HEIGHT: 65 IN | HEART RATE: 90 BPM | SYSTOLIC BLOOD PRESSURE: 132 MMHG | DIASTOLIC BLOOD PRESSURE: 78 MMHG | BODY MASS INDEX: 28.99 KG/M2

## 2024-01-10 DIAGNOSIS — R05.9 COUGH, UNSPECIFIED: ICD-10-CM

## 2024-01-10 DIAGNOSIS — R09.82 POSTNASAL DRIP: ICD-10-CM

## 2024-01-10 DIAGNOSIS — H61.21 IMPACTED CERUMEN, RIGHT EAR: ICD-10-CM

## 2024-01-10 PROCEDURE — 99213 OFFICE O/P EST LOW 20 MIN: CPT

## 2024-01-10 RX ORDER — AZITHROMYCIN 250 MG/1
250 TABLET, FILM COATED ORAL
Qty: 1 | Refills: 0 | Status: COMPLETED | COMMUNITY
Start: 2023-12-19 | End: 2024-01-10

## 2024-01-10 RX ORDER — PREDNISONE 20 MG/1
20 TABLET ORAL
Qty: 9 | Refills: 0 | Status: COMPLETED | COMMUNITY
Start: 2023-12-19 | End: 2024-01-10

## 2024-01-10 NOTE — PHYSICAL EXAM
[No Acute Distress] : no acute distress [Well Nourished] : well nourished [Well Developed] : well developed [Well-Appearing] : well-appearing [EOMI] : extraocular movements intact [No JVD] : no jugular venous distention [No Respiratory Distress] : no respiratory distress  [No Accessory Muscle Use] : no accessory muscle use [Clear to Auscultation] : lungs were clear to auscultation bilaterally [Normal Rate] : normal rate  [Regular Rhythm] : with a regular rhythm [Normal S1, S2] : normal S1 and S2 [No Carotid Bruits] : no carotid bruits [No Edema] : there was no peripheral edema [Non-distended] : non-distended [No CVA Tenderness] : no CVA  tenderness [Grossly Normal Strength/Tone] : grossly normal strength/tone [No Rash] : no rash [Coordination Grossly Intact] : coordination grossly intact [No Focal Deficits] : no focal deficits [Normal Gait] : normal gait [Normal Affect] : the affect was normal [Normal Mood] : the mood was normal [Normal Insight/Judgement] : insight and judgment were intact [de-identified] : +ve PND post pharynx, +ve tonsillar edema R side   +ve cerumen impaction R sided

## 2024-01-10 NOTE — HISTORY OF PRESENT ILLNESS
[FreeTextEntry1] : MARISSA is a 68-year-old female here for a follow up. pt c/o right ear pain, persistent cough, and swollen tonsils.  [de-identified] : 69 yo female reports 2 d hx of R ear discomfort and R tonsilar pain that has improved over the last 48 hrs  no f/c/s  no N/V/D no abd pain +ve good appetite  no nasal congestion  +ve PND,  +ve "wet cough"   improved c OTC Robitussin  no rashes

## 2024-01-23 ENCOUNTER — NON-APPOINTMENT (OUTPATIENT)
Age: 69
End: 2024-01-23

## 2024-02-29 LAB
ESTIMATED AVERAGE GLUCOSE: 117 MG/DL
HBA1C MFR BLD HPLC: 5.7 %

## 2024-03-01 ENCOUNTER — OUTPATIENT (OUTPATIENT)
Dept: OUTPATIENT SERVICES | Facility: HOSPITAL | Age: 69
LOS: 1 days | End: 2024-03-01
Payer: MEDICARE

## 2024-03-01 ENCOUNTER — APPOINTMENT (OUTPATIENT)
Dept: ULTRASOUND IMAGING | Facility: CLINIC | Age: 69
End: 2024-03-01
Payer: MEDICARE

## 2024-03-01 DIAGNOSIS — Z90.09 ACQUIRED ABSENCE OF OTHER PART OF HEAD AND NECK: Chronic | ICD-10-CM

## 2024-03-01 DIAGNOSIS — R73.03 PREDIABETES: ICD-10-CM

## 2024-03-01 DIAGNOSIS — E78.5 HYPERLIPIDEMIA, UNSPECIFIED: ICD-10-CM

## 2024-03-01 DIAGNOSIS — M85.80 OTHER SPECIFIED DISORDERS OF BONE DENSITY AND STRUCTURE, UNSPECIFIED SITE: ICD-10-CM

## 2024-03-01 DIAGNOSIS — E03.8 OTHER SPECIFIED HYPOTHYROIDISM: ICD-10-CM

## 2024-03-01 DIAGNOSIS — E04.1 NONTOXIC SINGLE THYROID NODULE: ICD-10-CM

## 2024-03-01 DIAGNOSIS — Z98.890 OTHER SPECIFIED POSTPROCEDURAL STATES: Chronic | ICD-10-CM

## 2024-03-01 DIAGNOSIS — Z90.49 ACQUIRED ABSENCE OF OTHER SPECIFIED PARTS OF DIGESTIVE TRACT: Chronic | ICD-10-CM

## 2024-03-01 LAB
ALBUMIN SERPL ELPH-MCNC: 4.5 G/DL
ALP BLD-CCNC: 86 U/L
ALT SERPL-CCNC: 16 U/L
ANION GAP SERPL CALC-SCNC: 13 MMOL/L
AST SERPL-CCNC: 18 U/L
BILIRUB SERPL-MCNC: 0.6 MG/DL
BUN SERPL-MCNC: 15 MG/DL
CALCIUM SERPL-MCNC: 9.3 MG/DL
CHLORIDE SERPL-SCNC: 102 MMOL/L
CHOLEST SERPL-MCNC: 248 MG/DL
CO2 SERPL-SCNC: 26 MMOL/L
CREAT SERPL-MCNC: 0.9 MG/DL
EGFR: 71 ML/MIN/1.73M2
GLUCOSE SERPL-MCNC: 106 MG/DL
HDLC SERPL-MCNC: 62 MG/DL
LDLC SERPL CALC-MCNC: 164 MG/DL
NONHDLC SERPL-MCNC: 186 MG/DL
POTASSIUM SERPL-SCNC: 4.3 MMOL/L
PROT SERPL-MCNC: 7.5 G/DL
SODIUM SERPL-SCNC: 141 MMOL/L
T4 FREE SERPL-MCNC: 1.4 NG/DL
TRIGL SERPL-MCNC: 124 MG/DL
TSH SERPL-ACNC: 1.75 UIU/ML

## 2024-03-01 PROCEDURE — 76536 US EXAM OF HEAD AND NECK: CPT | Mod: 26

## 2024-03-01 PROCEDURE — 76536 US EXAM OF HEAD AND NECK: CPT

## 2024-03-14 NOTE — ASU PATIENT PROFILE, ADULT - MEDICATION HERBAL REMEDIES, PROFILE
[Menarche Age ____] : age at menarche was [unfilled] no [Menopause Age____] : age at menopause was [unfilled] [History of Hormone Replacement Treatment] : has a history of hormone replacement treatment [Total Preg ___] : G[unfilled] [Live Births ___] : P[unfilled]  [Age At Live Birth ___] : Age at live birth: [unfilled] [FreeTextEntry6] : No [FreeTextEntry7] : Yes [FreeTextEntry8] : Yes

## 2024-03-15 ENCOUNTER — NON-APPOINTMENT (OUTPATIENT)
Age: 69
End: 2024-03-15

## 2024-03-20 ENCOUNTER — APPOINTMENT (OUTPATIENT)
Dept: ENDOCRINOLOGY | Facility: CLINIC | Age: 69
End: 2024-03-20
Payer: MEDICARE

## 2024-03-20 VITALS
SYSTOLIC BLOOD PRESSURE: 128 MMHG | HEART RATE: 94 BPM | WEIGHT: 174 LBS | OXYGEN SATURATION: 96 % | BODY MASS INDEX: 28.99 KG/M2 | DIASTOLIC BLOOD PRESSURE: 82 MMHG | HEIGHT: 65 IN

## 2024-03-20 DIAGNOSIS — R73.03 PREDIABETES.: ICD-10-CM

## 2024-03-20 DIAGNOSIS — E66.9 OBESITY, UNSPECIFIED: ICD-10-CM

## 2024-03-20 DIAGNOSIS — M85.80 OTHER SPECIFIED DISORDERS OF BONE DENSITY AND STRUCTURE, UNSPECIFIED SITE: ICD-10-CM

## 2024-03-20 DIAGNOSIS — E03.9 HYPOTHYROIDISM, UNSPECIFIED: ICD-10-CM

## 2024-03-20 DIAGNOSIS — E78.5 HYPERLIPIDEMIA, UNSPECIFIED: ICD-10-CM

## 2024-03-20 DIAGNOSIS — E04.1 NONTOXIC SINGLE THYROID NODULE: ICD-10-CM

## 2024-03-20 PROCEDURE — 99214 OFFICE O/P EST MOD 30 MIN: CPT

## 2024-03-20 PROCEDURE — G2211 COMPLEX E/M VISIT ADD ON: CPT

## 2024-03-20 RX ORDER — MENTHOL/CAMPHOR 0.5 %-0.5%
1000 LOTION (ML) TOPICAL
Refills: 0 | Status: DISCONTINUED | COMMUNITY
End: 2024-03-20

## 2024-03-20 RX ORDER — CALCIUM CITRATE/VITAMIN D3 315MG-6.25
TABLET ORAL
Refills: 0 | Status: ACTIVE | COMMUNITY

## 2024-03-20 NOTE — REVIEW OF SYSTEMS
[Chest Pain] : no chest pain [Shortness Of Breath] : no shortness of breath [SOB on Exertion] : no shortness of breath on exertion [Nausea] : no nausea [Abdominal Pain] : no abdominal pain [Nocturia] : no nocturia [Polyuria] : no polyuria [Tremors] : no tremors [Depression] : no depression [Anxiety] : no anxiety [Polydipsia] : no polydipsia [FreeTextEntry2] : weight stable

## 2024-03-20 NOTE — HISTORY OF PRESENT ILLNESS
[FreeTextEntry1] : Interval Hx:  has been busy dealing with daughters health issues, also missing statin doses  Hypothyroid Dx 2010 on routine blood test, on Synthroid (AKBAR) 75 mcg 1 tab daily - adherent with dosing and administration Left thyroid nodule DX 2016 on sonogram Current sx: denies anterior neck pain, dysphagia, no voice changes ------------------------------------------------------- Osteopenia on DXA 2016 and 2018 - h/o primary hyperparathyroidism s/p left inferior parathyroid adenoma removal in 2016  Current meds:  calcium 1200 mg daily (calcium tabs + other supplements) current sx: denies back/hip pains. denies falls/fracture Dairy intake - sometimes yogurt/cheese. occasional milk Exercise - walking ----------------------------------------------------------- PreDM - eating better, walking more

## 2024-03-20 NOTE — PHYSICAL EXAM
[No Acute Distress] : no acute distress [Alert] : alert [No LAD] : no lymphadenopathy [EOMI] : extra ocular movement intact [Thyroid Not Enlarged] : the thyroid was not enlarged [No Thyroid Nodules] : no palpable thyroid nodules [No Respiratory Distress] : no respiratory distress [Clear to Auscultation] : lungs were clear to auscultation bilaterally [Normal S1, S2] : normal S1 and S2 [Normal Rate] : heart rate was normal [Not Tender] : non-tender [Soft] : abdomen soft [Oriented x3] : oriented to person, place, and time [Normal Affect] : the affect was normal [Normal Insight/Judgement] : insight and judgment were intact [Normal Mood] : the mood was normal [Obese] : obese

## 2024-03-20 NOTE — DATA REVIEWED
[FreeTextEntry1] : 6/24/16 parathyroid imaging - Left lower pole parathyroid adenoma 9/21/16 Path Left inferios parathyroid - hypercellular, enlarged ================================================= DXA 6/3/16 tscores L1-L4 -0.4 LFN -1.4 RFN -1.5 L forearm -1.2  DXA 6/2018 Tscores L1-L4 -0.3 Left FN -1.6 Right FN -1.4  DXA 6/8/20 (done at different facility) Tscores L1-L4 -0.6 Hip -1.1 FRAX: MOF 7.5%, Hp Fx 0.5%  PROCEDURE DATE:  06/09/2022 INTERPRETATION:  CLINICAL INDICATION: History of osteopenia. Follow-up study. Screening for osteoporosis. Thank you for referring your patient for bone densitometry and vertebral fracture assessment on the  HOLOGIC machine.  The results are expressed as a T-score, or the standard deviation from the mean young normal value, which is the basis for the WHO diagnostic criteria for bone density. COMPARISON: Bone Density dated 6/8/2020. RESULTS: Spine: -0.7, normal; there has been a -1.4% change in the bone density of the lumbar spine as compared with the prior study. Femoral neck: -1.1, osteopenia. Total hip: -0.2, osteopenia; there has been a +2.7% change in the bone density of the total left hip as compared with the prior study. Single lateral view of the thoracolumbar spine (T10-L4)  demonstrates no evidence of vertebral compression deformity. IMPRESSION: Osteopenia. FRACTURE RISK: Using the FRAX 10 year fracture risk calculator the patient's ten-year risk of any fracture is 7.8% and the patient's risk of  hip fracture  is  0.6%. Additional risk factors used in the FRAX calculation: None. TREATMENT RECOMMENDATIONS:  Based on NOF treatment guidelines medical therapy is not recommended at this time. FOLLOW-UP: A repeat examination is recommended in 2 years.   ==================================================== thyroid sonogram 6/3/16 diffusely heterogeneous thyroid Left M-L pole hyperechoic nodule 6x5x5 mm Inferior to Left thyroid 1.6x.8x.8 cm - lymph node vs parathyroid nodule  Thyroid songram 11/14/16 Left MP nodule 7x5x6 mm -stable  Thyroid sonogram 12/2017 - Left thyroid nodule 6x6x7 mm  Thyroid sono 12/2018 diffusely heterogenous gland, 5 mm area not nodule but heterogeneou area  EXAM: US THYROID PARATHYROID  PROCEDURE DATE: 11/22/2019   INTERPRETATION: CLINICAL INFORMATION: Hypothyroidism. Left nodule. On Synthroid.  COMPARISON: 12/14/2018  TECHNIQUE: Sonography of the thyroid.  FINDINGS:  Right Lobe: 4.7 x 2.1 x 1.6 cm.. The parenchyma is diffusely heterogeneous. No discrete nodule is  seen.  Left Lobe: 4.4 x 1.6 x 1.7 cm. 5 mm hypoechoic area in the midpole of the left thyroid lobe,  unchanged.. Otherwise, the parenchyma is diffusely heterogeneous.  Isthmus: 6 mm.  Cervical Lymph Nodes: No enlarged or abnormal morphology cervical nodes.  IMPRESSION:  Diffusely heterogeneous thyroid gland consistent with underlying thyroiditis. Stable small  hypoechoic area in the midpole of the left thyroid lobe.   EXAM:  US THYROID ONLY PROCEDURE DATE:  11/04/2020 INTERPRETATION:  CLINICAL INFORMATION: Hypothyroidism. On Synthroid for 5 years. COMPARISON: November 22, 2019 and December 14, 2018. TECHNIQUE: Sonography of the thyroid. FINDINGS: Right Lobe: 4.5 x 2.1 x 1.6 cm. Diffusely heterogeneous in appearance with numerous small hypoechoic foci. A hyperechoic solid nodule in the lower pole (TI-RAD 3), measuring 0.4 x 0.3 x 0.5 cm appears similar, dating back to December 14, 2018. Left Lobe: 5.3 x 1.7 x 1.9 cm. Diffusely heterogeneous in appearance with numerous small hypoechoic foci. Similar appearance of hypoechoic region in the left midpole, measuring 0.5 x 0.5 x 0.4 cm.  Isthmus: 6 mm. Cervical Lymph Nodes: No enlarged or abnormal morphology cervical nodes. IMPRESSION: Diffuse heterogeneity of the thyroid gland, unchanged since December 14, 2018.   EXAM:  US THYROID ONLY PROCEDURE DATE:  11/05/2021 INTERPRETATION:  CLINICAL INFORMATION: Thyroid nodule COMPARISON: Ultrasound 12/14/2018 TECHNIQUE: Sonography of the thyroid. FINDINGS: Right Lobe: 4.1 cm x  2.2 cm x 1.7 cm. 4 mm echogenic inferior nodule unchanged in retrospect since 12/14/2018 Left Lobe: 5.5 cm x 1.5 cm x 1.6 cm. Echo poor mid nodule 7 x 4 x 4 mm unchanged from 12/14/2018 Isthmus: 5 mm. Heterogeneous parenchyma consistent with thyroiditis. Cervical Lymph Nodes: No enlarged or abnormal morphology cervical nodes. IMPRESSION: Thyroiditis with subcentimeter nodules unchanged  	 EXAM: [id number] - US THYROID ONLY  - ORDERED BY: LISA RIZO PROCEDURE DATE:  01/10/2023 INTERPRETATION:  CLINICAL INFORMATION: Hypothyroidism. Follow-up nodules. COMPARISON: 11/5/2021 TECHNIQUE: Sonography of the thyroid. FINDINGS: Right Lobe: 3.9 cm x  1.9 cm x 1.7 cm. The parenchyma is diffusely heterogeneous/mottled in echotexture. No discrete nodule is seen. Left Lobe: 5.3 cm x 1.6 cm x 1.5 cm. The parenchyma is diffusely heterogeneous/mottled in echotexture. There is a stable hypoechoic nodule in the midpole measuring 7 x 3 x 4 mm. Isthmus: 6 mm. Cervical Lymph Nodes: No enlarged or abnormal morphology cervical nodes. IMPRESSION: Diffusely heterogeneous/mottled thyroid gland consistent with underlying thyroiditis. Stable subcentimeter hypoechoic nodule in the left thyroid lobe. A follow-up ultrasound is suggested in one year  	 EXAM: [id number] - US THYROID ONLY  - ORDERED BY: LISA RIZO ROCEDURE DATE:  03/01/2024 INTERPRETATION:  CLINICAL INFORMATION: Follow-up thyroid sonogram COMPARISON: 1/23 TECHNIQUE: Sonography of the thyroid. FINDINGS: Right Lobe: 4.4 x 1.5 x 1.6. Diffusely heterogeneous in appearance with numerous small hypoechoic foci. Left Lobe: 4.7 x 1.7 x 1.3. Diffusely heterogeneous in appearance with numerous small hypoechoic foci. Stable 6 mm hypoechoic focus previously described on ultrasound unchanged Isthmus: 3 mm. Cervical Lymph Nodes: No enlarged or abnormal morphology cervical nodes. IMPRESSION: Stable thyroid sonogram consistent with thyroiditis. =============================================== Labs 11/8/19 Gluc 98 Cr 0.97, GFR 63 A1c 5.8% FT4 1.53, TSH 0.726 Ca 9.4, Vit D 39.4  Labs 2/28/20 Gluc 107, A1c 5.6% Cr 0.99, GFR 61 Ca 9.4, Vit D 43 TSH 0.251, FT4 1.78  Labs 7/2/20 Gluc 104, A1c 5.8 Cr 0.96, GFR 64 TSH 2.570 Vit D 35.6, Ca 9.2  Labs 10/2/20 Gluc 100, A1c 5.9 Cr 0.98, GFR 62 TSH 5.960, FT4 1.14 Vit D 43, ca 9.0  "Labs 3/3/21 reviewed TSH low, needs less thyroid hormone - decrease Synthroid to 75 mcg daily and repeat TFTS 8 weeks Calcium and vitamin D normal stable PreDM w a1c 5.8"

## 2024-03-20 NOTE — ASSESSMENT
[FreeTextEntry1] : 68 yr old female with 1. Hypothyroid - normal TSH, clinically euthyroid - cont Synthroid 75 mcg 1 tab daily - repeat TFTs 1 week before next visit  2. small thyroid nodules, diffusely heterogenous gland c/w thyroiditis - stable findings for many years -  repeat sono as needed  3. PreDM - a1c 5.7 which is improved - intolerant to MFN - cont to monitor w labs - cont diet and exercise  4. Osteopenia - stable on recent DXA vit D and calcium okay, asymptomatic.  - DXA due summer 2024 - encouraged weight bearing exercises - cont Vit D and calcium  5. Obesity BMI 31--> 28- intolerant to MFN - cont lifestyle changes, try to watch diet as best as she can and increase exercise (has been taking care of daughter and grandchildren)  6. HLD - LDL chol too high due to nonadherence w statin - to resume daily statin dosing - f/u cardio

## 2024-03-26 ENCOUNTER — APPOINTMENT (OUTPATIENT)
Dept: FAMILY MEDICINE | Facility: CLINIC | Age: 69
End: 2024-03-26
Payer: MEDICARE

## 2024-03-26 ENCOUNTER — NON-APPOINTMENT (OUTPATIENT)
Age: 69
End: 2024-03-26

## 2024-03-26 VITALS
WEIGHT: 172 LBS | BODY MASS INDEX: 28.66 KG/M2 | HEIGHT: 65 IN | OXYGEN SATURATION: 97 % | DIASTOLIC BLOOD PRESSURE: 84 MMHG | SYSTOLIC BLOOD PRESSURE: 142 MMHG | HEART RATE: 93 BPM | TEMPERATURE: 98.3 F

## 2024-03-26 DIAGNOSIS — J06.9 ACUTE UPPER RESPIRATORY INFECTION, UNSPECIFIED: ICD-10-CM

## 2024-03-26 PROCEDURE — 99213 OFFICE O/P EST LOW 20 MIN: CPT

## 2024-03-26 NOTE — HISTORY OF PRESENT ILLNESS
[FreeTextEntry8] : 67 yo female presents with complaint of nasal congestion, cough dry nonproductive with associated hoarse voice. Symptoms began 3 - 4 days ago. Denies fever, chills, cp, palpitations, nvcd.

## 2024-03-26 NOTE — REVIEW OF SYSTEMS
[Hoarseness] : hoarseness [Nasal Discharge] : nasal discharge [Cough] : cough [Negative] : Psychiatric

## 2024-03-26 NOTE — HEALTH RISK ASSESSMENT
[0] : 2) Feeling down, depressed, or hopeless: Not at all (0) [PHQ-2 Negative - No further assessment needed] : PHQ-2 Negative - No further assessment needed [[id number]] : 0

## 2024-03-26 NOTE — ASSESSMENT
[FreeTextEntry1] : Acute URI: recommend supportive care, start tylenol prn for fever/pain, recommend increased hydration, call EMS if symptoms worsen or develop sob. Recommend hand hygiene, cover mouth when coughing, wash hands frequently, f/u covid19/viral panel RTC 2 wks

## 2024-03-27 LAB
HPIV3 RNA SPEC QL NAA+PROBE: DETECTED
RAPID RVP RESULT: DETECTED
SARS-COV-2 RNA PNL RESP NAA+PROBE: NOT DETECTED

## 2024-06-03 ENCOUNTER — OUTPATIENT (OUTPATIENT)
Dept: OUTPATIENT SERVICES | Facility: HOSPITAL | Age: 69
LOS: 1 days | End: 2024-06-03
Payer: MEDICARE

## 2024-06-03 ENCOUNTER — APPOINTMENT (OUTPATIENT)
Dept: RADIOLOGY | Facility: CLINIC | Age: 69
End: 2024-06-03
Payer: MEDICARE

## 2024-06-03 DIAGNOSIS — E55.9 VITAMIN D DEFICIENCY, UNSPECIFIED: ICD-10-CM

## 2024-06-03 DIAGNOSIS — Z90.49 ACQUIRED ABSENCE OF OTHER SPECIFIED PARTS OF DIGESTIVE TRACT: Chronic | ICD-10-CM

## 2024-06-03 DIAGNOSIS — E04.1 NONTOXIC SINGLE THYROID NODULE: ICD-10-CM

## 2024-06-03 DIAGNOSIS — M85.80 OTHER SPECIFIED DISORDERS OF BONE DENSITY AND STRUCTURE, UNSPECIFIED SITE: ICD-10-CM

## 2024-06-03 DIAGNOSIS — E66.9 OBESITY, UNSPECIFIED: ICD-10-CM

## 2024-06-03 DIAGNOSIS — Z90.09 ACQUIRED ABSENCE OF OTHER PART OF HEAD AND NECK: Chronic | ICD-10-CM

## 2024-06-03 DIAGNOSIS — E78.5 HYPERLIPIDEMIA, UNSPECIFIED: ICD-10-CM

## 2024-06-03 DIAGNOSIS — Z98.890 OTHER SPECIFIED POSTPROCEDURAL STATES: Chronic | ICD-10-CM

## 2024-06-03 DIAGNOSIS — E03.9 HYPOTHYROIDISM, UNSPECIFIED: ICD-10-CM

## 2024-06-03 PROCEDURE — 77080 DXA BONE DENSITY AXIAL: CPT | Mod: 26

## 2024-06-03 PROCEDURE — 77080 DXA BONE DENSITY AXIAL: CPT

## 2024-10-25 ENCOUNTER — APPOINTMENT (OUTPATIENT)
Dept: ENDOCRINOLOGY | Facility: CLINIC | Age: 69
End: 2024-10-25
Payer: MEDICARE

## 2024-10-25 VITALS
HEIGHT: 65 IN | HEART RATE: 80 BPM | WEIGHT: 172 LBS | DIASTOLIC BLOOD PRESSURE: 84 MMHG | BODY MASS INDEX: 28.66 KG/M2 | SYSTOLIC BLOOD PRESSURE: 120 MMHG | OXYGEN SATURATION: 97 %

## 2024-10-25 DIAGNOSIS — E04.1 NONTOXIC SINGLE THYROID NODULE: ICD-10-CM

## 2024-10-25 DIAGNOSIS — E66.9 OBESITY, UNSPECIFIED: ICD-10-CM

## 2024-10-25 DIAGNOSIS — M85.80 OTHER SPECIFIED DISORDERS OF BONE DENSITY AND STRUCTURE, UNSPECIFIED SITE: ICD-10-CM

## 2024-10-25 DIAGNOSIS — E03.8 OTHER SPECIFIED HYPOTHYROIDISM: ICD-10-CM

## 2024-10-25 DIAGNOSIS — E78.5 HYPERLIPIDEMIA, UNSPECIFIED: ICD-10-CM

## 2024-10-25 DIAGNOSIS — Z86.39 PERSONAL HISTORY OF OTHER ENDOCRINE, NUTRITIONAL AND METABOLIC DISEASE: ICD-10-CM

## 2024-10-25 DIAGNOSIS — R73.03 PREDIABETES.: ICD-10-CM

## 2024-10-25 PROCEDURE — 99214 OFFICE O/P EST MOD 30 MIN: CPT

## 2025-02-19 ENCOUNTER — APPOINTMENT (OUTPATIENT)
Dept: MRI IMAGING | Facility: CLINIC | Age: 70
End: 2025-02-19

## 2025-04-09 ENCOUNTER — NON-APPOINTMENT (OUTPATIENT)
Age: 70
End: 2025-04-09

## 2025-04-11 ENCOUNTER — APPOINTMENT (OUTPATIENT)
Dept: ENDOCRINOLOGY | Facility: CLINIC | Age: 70
End: 2025-04-11
Payer: MEDICARE

## 2025-04-11 VITALS
WEIGHT: 179 LBS | BODY MASS INDEX: 29.82 KG/M2 | SYSTOLIC BLOOD PRESSURE: 142 MMHG | DIASTOLIC BLOOD PRESSURE: 80 MMHG | HEIGHT: 65 IN | HEART RATE: 81 BPM | OXYGEN SATURATION: 98 %

## 2025-04-11 DIAGNOSIS — E78.5 HYPERLIPIDEMIA, UNSPECIFIED: ICD-10-CM

## 2025-04-11 DIAGNOSIS — E66.9 OBESITY, UNSPECIFIED: ICD-10-CM

## 2025-04-11 DIAGNOSIS — M85.80 OTHER SPECIFIED DISORDERS OF BONE DENSITY AND STRUCTURE, UNSPECIFIED SITE: ICD-10-CM

## 2025-04-11 DIAGNOSIS — R73.03 PREDIABETES.: ICD-10-CM

## 2025-04-11 DIAGNOSIS — E03.8 OTHER SPECIFIED HYPOTHYROIDISM: ICD-10-CM

## 2025-04-11 PROCEDURE — 99214 OFFICE O/P EST MOD 30 MIN: CPT

## 2025-04-11 RX ORDER — VITAMIN E 268 MG
CAPSULE ORAL
Refills: 0 | Status: ACTIVE | COMMUNITY

## 2025-05-19 ENCOUNTER — APPOINTMENT (OUTPATIENT)
Dept: CARDIOLOGY | Facility: CLINIC | Age: 70
End: 2025-05-19
Payer: MEDICARE

## 2025-05-19 ENCOUNTER — NON-APPOINTMENT (OUTPATIENT)
Age: 70
End: 2025-05-19

## 2025-05-19 VITALS
SYSTOLIC BLOOD PRESSURE: 142 MMHG | OXYGEN SATURATION: 99 % | WEIGHT: 175 LBS | BODY MASS INDEX: 29.16 KG/M2 | DIASTOLIC BLOOD PRESSURE: 84 MMHG | HEIGHT: 65 IN | HEART RATE: 66 BPM

## 2025-05-19 DIAGNOSIS — E78.5 HYPERLIPIDEMIA, UNSPECIFIED: ICD-10-CM

## 2025-05-19 PROCEDURE — 93000 ELECTROCARDIOGRAM COMPLETE: CPT

## 2025-05-19 PROCEDURE — 99214 OFFICE O/P EST MOD 30 MIN: CPT | Mod: 25

## 2025-05-19 RX ORDER — ROSUVASTATIN CALCIUM 5 MG/1
5 TABLET, FILM COATED ORAL
Qty: 90 | Refills: 2 | Status: ACTIVE | COMMUNITY
Start: 2025-05-19 | End: 1900-01-01

## 2025-09-08 ENCOUNTER — LABORATORY RESULT (OUTPATIENT)
Age: 70
End: 2025-09-08

## 2025-09-11 ENCOUNTER — APPOINTMENT (OUTPATIENT)
Dept: ENDOCRINOLOGY | Facility: CLINIC | Age: 70
End: 2025-09-11
Payer: MEDICARE

## 2025-09-11 VITALS
DIASTOLIC BLOOD PRESSURE: 76 MMHG | HEART RATE: 85 BPM | WEIGHT: 180 LBS | OXYGEN SATURATION: 98 % | HEIGHT: 65 IN | BODY MASS INDEX: 29.99 KG/M2 | SYSTOLIC BLOOD PRESSURE: 138 MMHG

## 2025-09-11 DIAGNOSIS — E66.9 OBESITY, UNSPECIFIED: ICD-10-CM

## 2025-09-11 DIAGNOSIS — E03.8 OTHER SPECIFIED HYPOTHYROIDISM: ICD-10-CM

## 2025-09-11 DIAGNOSIS — M85.80 OTHER SPECIFIED DISORDERS OF BONE DENSITY AND STRUCTURE, UNSPECIFIED SITE: ICD-10-CM

## 2025-09-11 DIAGNOSIS — R73.03 PREDIABETES.: ICD-10-CM

## 2025-09-11 PROCEDURE — 99214 OFFICE O/P EST MOD 30 MIN: CPT

## 2025-09-11 PROCEDURE — G2211 COMPLEX E/M VISIT ADD ON: CPT
